# Patient Record
Sex: FEMALE | Race: WHITE | Employment: FULL TIME | URBAN - METROPOLITAN AREA
[De-identification: names, ages, dates, MRNs, and addresses within clinical notes are randomized per-mention and may not be internally consistent; named-entity substitution may affect disease eponyms.]

---

## 2017-06-27 ENCOUNTER — APPOINTMENT (OUTPATIENT)
Dept: MAMMOGRAPHY | Age: 42
End: 2017-06-27

## 2017-06-27 ENCOUNTER — OFFICE VISIT (OUTPATIENT)
Dept: OBGYN CLINIC | Age: 42
End: 2017-06-27

## 2017-06-27 VITALS
DIASTOLIC BLOOD PRESSURE: 78 MMHG | RESPIRATION RATE: 18 BRPM | WEIGHT: 193.4 LBS | BODY MASS INDEX: 32.22 KG/M2 | HEIGHT: 65 IN | SYSTOLIC BLOOD PRESSURE: 120 MMHG

## 2017-06-27 DIAGNOSIS — R92.8 ABNORMAL MAMMOGRAM OF LEFT BREAST: ICD-10-CM

## 2017-06-27 DIAGNOSIS — E28.2 PCOS (POLYCYSTIC OVARIAN SYNDROME): ICD-10-CM

## 2017-06-27 DIAGNOSIS — R45.86 MOOD CHANGES: ICD-10-CM

## 2017-06-27 DIAGNOSIS — Z12.31 ENCOUNTER FOR SCREENING MAMMOGRAM FOR MALIGNANT NEOPLASM OF BREAST: ICD-10-CM

## 2017-06-27 DIAGNOSIS — N64.4 BREAST TENDERNESS: ICD-10-CM

## 2017-06-27 DIAGNOSIS — Z01.419 ENCOUNTER FOR GYNECOLOGICAL EXAMINATION (GENERAL) (ROUTINE) WITHOUT ABNORMAL FINDINGS: Primary | ICD-10-CM

## 2017-06-27 RX ORDER — FLUTICASONE PROPIONATE AND SALMETEROL 50; 250 UG/1; UG/1
POWDER RESPIRATORY (INHALATION)
Refills: 12 | COMMUNITY
Start: 2017-05-03 | End: 2022-07-08

## 2017-06-27 RX ORDER — NORETHINDRONE ACETATE AND ETHINYL ESTRADIOL, ETHINYL ESTRADIOL AND FERROUS FUMARATE 1MG-10(24)
1 KIT ORAL DAILY
Qty: 3 PACKAGE | Refills: 1 | Status: SHIPPED | OUTPATIENT
Start: 2017-06-27 | End: 2022-07-08

## 2017-06-27 RX ORDER — ESCITALOPRAM OXALATE 10 MG/1
TABLET ORAL
Refills: 3 | COMMUNITY
Start: 2017-05-16 | End: 2022-07-08

## 2017-06-27 RX ORDER — HYDROXYZINE HYDROCHLORIDE 10 MG/1
TABLET, FILM COATED ORAL
Refills: 0 | COMMUNITY
Start: 2017-05-30 | End: 2022-07-08

## 2017-06-27 RX ORDER — ALBUTEROL SULFATE 90 UG/1
AEROSOL, METERED RESPIRATORY (INHALATION)
Refills: 3 | COMMUNITY
Start: 2017-05-03 | End: 2022-07-08

## 2017-06-27 NOTE — MR AVS SNAPSHOT
Visit Information Date & Time Provider Department Dept. Phone Encounter #  
 6/27/2017  8:30 AM Miguel Stover MD [facility] [phone] [id number] Follow-up Instructions Return in about 1 year (around 6/27/2018), or if symptoms worsen or fail to improve, for Annual exam. Upcoming Health Maintenance Date Due  
 BREAST CANCER SCRN MAMMOGRAM 6/27/2017 INFLUENZA AGE 9 TO ADULT 8/1/2017 PAP AKA CERVICAL CYTOLOGY 2/18/2019 Allergies as of 6/27/2017  Review Complete On: 6/27/2017 By: Miguel Stover MD  
  
 Severity Noted Reaction Type Reactions Penicillins  01/07/2014    Rash Current Immunizations  Never Reviewed No immunizations on file. Not reviewed this visit You Were Diagnosed With   
  
 Codes Comments Encounter for gynecological examination (general) (routine) without abnormal findings    -  Primary ICD-10-CM: R71.375 ICD-9-CM: V72.31 Vitals BP Resp Height(growth percentile) Weight(growth percentile) LMP BMI  
 120/78 (BP 1 Location: Left arm, BP Patient Position: Sitting) 18 5' 4.5\" (1.638 m) 193 lb 6.4 oz (87.7 kg) 06/17/2017 (Exact Date) 32.68 kg/m2 OB Status Smoking Status Having regular periods Never Smoker Vitals History BMI and BSA Data Body Mass Index Body Surface Area  
 32.68 kg/m 2 2 m 2 Preferred Pharmacy Pharmacy Name Phone [id number] [facility], [address] [place] [address] Road [phone] Your Updated Medication List  
  
   
This list is accurate as of: 6/27/17  9:02 AM.  Always use your most recent med list.  
  
  
  
  
 ADVAIR DISKUS 250-50 mcg/dose diskus inhaler Generic drug:  fluticasone-salmeterol INHALE TWICE A DAY AS DIRECTED BENADRYL PO Take  by mouth. CELEXA PO Take 10 mg by mouth daily. escitalopram oxalate 10 mg tablet Commonly known as:  Tod Crump TAKE 1 TABLET BY MOUTH EVERY DAY  
  
 hydrOXYzine HCl 10 mg tablet Commonly known as:  ATARAX TAKE 1 TABLET BY MOUTH 3 TIMES A DAY AS NEEDED FOR ITCHING  
  
 MELATONIN PO Take  by mouth.  
  
 metFORMIN  mg tablet Commonly known as:  GLUCOPHAGE XR  
  
 pravastatin 20 mg tablet Commonly known as:  PRAVACHOL  
  
 PROAIR HFA 90 mcg/actuation inhaler Generic drug:  albuterol INHALE 2 PUFFS EVERY 4 HOURS AS NEEDED  
  
 tiZANidine 6 mg capsule Commonly known as:  Gabino Moreno Follow-up Instructions Return in about 1 year (around 6/27/2018), or if symptoms worsen or fail to improve, for Annual exam.  
  
  
Patient Instructions Well Visit, Ages 25 to 48: Care Instructions Your Care Instructions Physical exams can help you stay healthy. Your doctor has checked your overall health and may have suggested ways to take good care of yourself. He or she also may have recommended tests. At home, you can help prevent illness with healthy eating, regular exercise, and other steps. Follow-up care is a key part of your treatment and safety. Be sure to make and go to all appointments, and call your doctor if you are having problems. It's also a good idea to know your test results and keep a list of the medicines you take. How can you care for yourself at home? · Reach and stay at a healthy weight. This will lower your risk for many problems, such as obesity, diabetes, heart disease, and high blood pressure. · Get at least 30 minutes of physical activity on most days of the week. Walking is a good choice. You also may want to do other activities, such as running, swimming, cycling, or playing tennis or team sports. Discuss any changes in your exercise program with your doctor. · Do not smoke or allow others to smoke around you. If you need help quitting, talk to your doctor about stop-smoking programs and medicines. These can increase your chances of quitting for good. · Talk to your doctor about whether you have any risk factors for sexually transmitted infections (STIs). Having one sex partner (who does not have STIs and does not have sex with anyone else) is a good way to avoid these infections. · Use birth control if you do not want to have children at this time. Talk with your doctor about the choices available and what might be best for you. · Protect your skin from too much sun. When you're outdoors from 10 a.m. to 4 p.m., stay in the shade or cover up with clothing and a hat with a wide brim. Wear sunglasses that block UV rays. Even when it's cloudy, put broad-spectrum sunscreen (SPF 30 or higher) on any exposed skin. · See a dentist one or two times a year for checkups and to have your teeth cleaned. · Wear a seat belt in the car. · Drink alcohol in moderation, if at all. That means no more than 2 drinks a day for men and 1 drink a day for women. Follow your doctor's advice about when to have certain tests. These tests can spot problems early. For everyone · Cholesterol. Have the fat (cholesterol) in your blood tested after age 21. Your doctor will tell you how often to have this done based on your age, family history, or other things that can increase your risk for heart disease. · Blood pressure. Have your blood pressure checked during a routine doctor visit. Your doctor will tell you how often to check your blood pressure based on your age, your blood pressure results, and other factors. · Vision. Talk with your doctor about how often to have a glaucoma test. 
· Diabetes. Ask your doctor whether you should have tests for diabetes. · Colon cancer. Have a test for colon cancer at age 48. You may have one of several tests. If you are younger than 48, you may need a test earlier if you have any risk factors.  Risk factors include whether you already had a precancerous polyp removed from your colon or whether your parent, brother, sister, or child has had colon cancer. For women · Breast exam and mammogram. Talk to your doctor about when you should have a clinical breast exam and a mammogram. Medical experts differ on whether and how often women under 50 should have these tests. Your doctor can help you decide what is right for you. · Pap test and pelvic exam. Begin Pap tests at age 24. A Pap test is the best way to find cervical cancer. The test often is part of a pelvic exam. Ask how often to have this test. 
· Tests for sexually transmitted infections (STIs). Ask whether you should have tests for STIs. You may be at risk if you have sex with more than one person, especially if your partners do not wear condoms. For men · Tests for sexually transmitted infections (STIs). Ask whether you should have tests for STIs. You may be at risk if you have sex with more than one person, especially if you do not wear a condom. · Testicular cancer exam. Ask your doctor whether you should check your testicles regularly. · Prostate exam. Talk to your doctor about whether you should have a blood test (called a PSA test) for prostate cancer. Experts differ on whether and when men should have this test. Some experts suggest it if you are older than 39 and are -American or have a father or brother who got prostate cancer when he was younger than 72. When should you call for help? Watch closely for changes in your health, and be sure to contact your doctor if you have any problems or symptoms that concern you. Where can you learn more? Go to http://alli-lynn.info/. Enter P072 in the search box to learn more about \"Well Visit, Ages 25 to 48: Care Instructions. \" Current as of: July 19, 2016 Content Version: 11.3 © 9127-6150 [employer].  Care instructions adapted under license by [employer] (which disclaims liability or warranty for this information). If you have questions about a medical condition or this instruction, always ask your healthcare professional. [address] any warranty or liability for your use of this information. Introducing [facility]! Dear Rosangela Medina: 
Thank you for requesting a [employer] account. Our records indicate that you already have an active [employer] account. You can access your account anytime at [email or web address] Did you know that you can access your hospital and ER discharge instructions at any time in [employer]? You can also review all of your test results from your hospital stay or ER visit. Additional Information If you have questions, please visit the Frequently Asked Questions section of the [employer] website at [email or web address] Remember, [employer] is NOT to be used for urgent needs. For medical emergencies, dial 911. Now available from your iPhone and Android! Please provide this summary of care documentation to your next provider. Your primary care clinician is listed as Aminah Tate. If you have any questions after today's visit, please call [phone].

## 2017-06-27 NOTE — PATIENT INSTRUCTIONS

## 2017-06-27 NOTE — PROGRESS NOTES
[facility]  [email or web address]  [phone]    Ja Vargas MD, [address] [facility]       Annual Gynecologic Exam  WWE 40-60  Chief Complaint   Patient presents with    Well Woman       Herbert Amaya is a 36 y.o.  WHITE OR   female who presents for an annual exam.  Patient's last menstrual period was 2017 (exact date). .    She does not report additional concerns today. Has breast pain that fluctuates before menses. +caffeine intake. Menses q month, not as heavy. She also c/o more mood changes/depression the week before menses. No Si/hi. Menstrual status:  Her periods are moderate. She does not report dysmenorrhea/painful menses. She does not report irregular bleeding. Sexual history and Contraception:  History   Sexual Activity    Sexual activity: Yes    Partners: Male    Birth control/ protection: Surgical     Comment: tubiligation     She never uses condoms with sexual activity. She does not reports new sexual partner(s) in the last year. The patient does not request STD testing. Preventive Medicine History:  Her last annual GYN exam was about one year ago. Her most recent Pap smear result: normal was obtained in 2014. Her most recent HR HPV screen was Negative obtained 3 year(s) ago. She does not have a history of KIKO 2, 3 or cervical cancer. Breast health:  Last mammogram: was abnormal: 2016. A mammogram was scheduled for today. Breast cancer family updated: see . Bone health: Peter Cheung She does not have a history of osteopenia/osteoporosis. Osteoporosis family history updated: see .      Past Medical History:   Diagnosis Date    Anxiety     Ectopic pregnancy     History of mammogram 5/26/15    Negative    Hyperlipidemia     Pap smear for cervical cancer screening 14    Negative, HPV negative    PCOS (polycystic ovarian syndrome)      OB History    Para Term  AB Living   3 2 2  1 2   SAB TAB Ectopic Molar Multiple Live Births     1         # Outcome Date GA Lbr Panfilo/2nd Weight Sex Delivery Anes PTL Lv   3 Ectopic            2 Term            1 Term               Obstetric Comments   Menarche: 10   LMP: 15. # of Children:  2. Age at Delivery of First Child:  26.   Hysterectomy/oophorectomy:  NO/NO. Breast Bx:  no.  Hx of Breast Feeding:  yes. BCP:  yes. Hormone therapy:  no.        Past Surgical History:   Procedure Laterality Date    HX  SECTION      HX GYN      ablation of endometriosis (clarify with pt at [facility]) ? op note vs endo ablation    HX OTHER SURGICAL      adominoplasty    LAP,TUBAL CAUTERY       Family History   Problem Relation Age of Onset    Breast Cancer Other     Heart Attack Other     Heart Disease Other     Elevated Lipids Other     Hypertension Other     Other Other      lung cancer    No Known Problems Mother     Breast Cancer Maternal Grandmother     No Known Problems Father      Social History     Social History    Marital status:      Spouse name: N/A    Number of children: N/A    Years of education: N/A     Occupational History    Not on file. Social History Main Topics    Smoking status: Never Smoker    Smokeless tobacco: Never Used    Alcohol use No    Drug use: No    Sexual activity: Yes     Partners: Male     Birth control/ protection: Surgical      Comment: tubiligation     Other Topics Concern    Not on file     Social History Narrative       Allergies   Allergen Reactions    Penicillins Rash       Current Outpatient Prescriptions   Medication Sig    LO LOESTRIN FE 1 mg-10 mcg (24)/10 mcg (2) tab Take 1 Tab by mouth daily. KXL#[id number] PCN#[id number] GRP#[id number] ZD#[phone]    pravastatin (PRAVACHOL) 20 mg tablet     metFORMIN ER (GLUCOPHAGE XR) 500 mg tablet     CITALOPRAM HYDROBROMIDE (CELEXA PO) Take 10 mg by mouth daily.     hydrOXYzine HCl (ATARAX) 10 mg tablet TAKE 1 TABLET BY MOUTH 3 TIMES A DAY AS NEEDED FOR ITCHING    ADVAIR DISKUS 250-50 mcg/dose diskus inhaler INHALE TWICE A DAY AS DIRECTED    escitalopram oxalate (LEXAPRO) 10 mg tablet TAKE 1 TABLET BY MOUTH EVERY DAY    PROAIR HFA 90 mcg/actuation inhaler INHALE 2 PUFFS EVERY 4 HOURS AS NEEDED    tiZANidine (ZANAFLEX) 6 mg capsule     DIPHENHYDRAMINE HCL (BENADRYL PO) Take  by mouth.  MELATONIN PO Take  by mouth. No current facility-administered medications for this visit.         Patient Active Problem List   Diagnosis Code    PCOS (polycystic ovarian syndrome) E28.2       Review of Systems - History obtained from the patient  Constitutional: negative for weight loss, fever, night sweats  HEENT: negative for hearing loss, earache, congestion, snoring, sorethroat  CV: negative for chest pain, palpitations, edema  Resp: negative for cough, shortness of breath, wheezing  GI: negative for change in bowel habits, abdominal pain, black or bloody stools  : negative for frequency, dysuria, hematuria, vaginal discharge  MSK: negative for back pain, joint pain, muscle pain  Breast: negative for breast lumps, nipple discharge, galactorrhea  Skin :negative for itching, rash, hives  Neuro: negative for dizziness, headache, confusion, weakness  Psych: negative for anxiety, depression, change in mood  Heme/lymph: negative for bleeding, bruising, pallor    Physical Exam  Visit Vitals    /78 (BP 1 Location: Left arm, BP Patient Position: Sitting)    Resp 18    Ht 5' 4.5\" (1.638 m)    Wt 193 lb 6.4 oz (87.7 kg)    LMP 06/17/2017 (Exact Date)    BMI 32.68 kg/m2       Constitutional  · Appearance: well-nourished, well developed, alert, in no acute distress    HENT  · Head and Face: appears normal    Neck  · Inspection/Palpation: normal appearance, no masses or tenderness  · Lymph Nodes: no lymphadenopathy present  · Thyroid: gland size normal, nontender, no nodules or masses present on palpation    Chest  · Respiratory Effort: breathing labored  · Auscultation: normal breath sounds    Cardiovascular  · Heart:  · Auscultation: regular rate and rhythm without murmur    Breasts  · Inspection of Breasts: breasts symmetrical, no skin changes, no discharge present, nipple appearance normal, no skin retraction present  · Palpation of Breasts and Axillae: no masses present on palpation, no breast tenderness  · Axillary Lymph Nodes: no lymphadenopathy present    Gastrointestinal  · Abdominal Examination: abdomen non-tender to palpation, normal bowel sounds, no masses present  · Liver and spleen: no hepatomegaly present, spleen not palpable  · Hernias: no hernias identified    Genitourinary  · External Genitalia: normal appearance for age, no discharge present, no tenderness present, no inflammatory lesions present, no masses present, no atrophy present  · Vagina: normal vaginal vault without central or paravaginal defects, no discharge present, no inflammatory lesions present, no masses present  · Bladder: non-tender to palpation  · Urethra: appears normal  · Cervix: normal   · Uterus: normal size, shape and consistency  · Adnexa: no adnexal tenderness present, no adnexal masses present  · Perineum: perineum within normal limits, no evidence of trauma, no rashes or skin lesions present  · Anus: anus within normal limits, no hemorrhoids present  · Inguinal Lymph Nodes: no lymphadenopathy present    Skin  · General Inspection: no rash, no lesions identified    Neurologic/Psychiatric  · Mental Status:  · Orientation: grossly oriented to person, place and time  · Mood and Affect: mood normal, affect appropriate    Assessment:  36 y.o.  for well woman exam  Encounter Diagnoses   Name Primary?     Encounter for gynecological examination (general) (routine) without abnormal findings Yes    Mood changes (HCC)     Breast tenderness     PCOS (polycystic ovarian syndrome)        Plan:  The patient was counseled about diet, exercise, healthy lifestyle  We discussed current self breast exam and mammogram recommendations  We discussed current pap smear and HR HPV testing guidelines  We recommend follow up in one year for routine annual gynecologic exam or sooner if needed  We recommend follow up with a primary care physician for any chronic medical problems or non-gynecologic concerns    We discussed calcium/vitamin D/weight bearing exercise and osteoporosis prevention  Handouts were given to the patient    We discussed typical perimenopausal bleeding patterns. I recommend that she notify MD for chaotic or symptomatic bleeding, or bleeding in between menses or intermenstrual bleeding for additional evaluation. Discussed risks, benefits and alternatives of OCP: including but not limited to dvt/pe/mi/cva/ca/gi risks. Wants to see if helps with mood/regulate cycles/breast pain. OCP fu check mood 3 mos or sooner prn. PCP fu for managing SSRI/SRNI     Folllow up:  [x] return for annual well woman exam in one year or sooner if she is having problems  [] follow up and ultrasound  [x] mammogram  [] 6 months  [] 3 months  [] 1 month    Orders Placed This Encounter    LO LOESTRIN FE 1 mg-10 mcg (24)/10 mcg (2) tab       Results for orders placed or performed in visit on 06/27/17   [facility] MAMMO BI SCREENING INCL CAD    Narrative    STUDY:  Bilateral digital screening mammogram      INDICATION:  Screening    COMPARISON:  Most recent 2016      FINDINGS: Bilateral digital screening mammography was performed, and is  interpreted in conjunction with a computer assisted detection (CAD) system. The  breast parenchymal pattern is scattered fibroglandular densities. Possible  increasing density noted in the left breast.  Spot compression views and  possible ultrasound are recommended for further evaluation.   No new masses or  suspicious calcifications are identified in the right breast.       Impression    IMPRESSION:   Possible increasing density, left breast.  Additional evaluation is indicated. The patient will be contacted for this, and a supplemental report will follow. No mammographic evidence of malignancy, right breast.    BIRADS 0:  Needs additional imaging evaluation.

## 2017-06-28 ENCOUNTER — HOSPITAL ENCOUNTER (OUTPATIENT)
Dept: ULTRASOUND IMAGING | Age: 42
Discharge: HOME | End: 2017-06-28
Attending: OBSTETRICS & GYNECOLOGY
Payer: COMMERCIAL

## 2017-06-28 ENCOUNTER — HOSPITAL ENCOUNTER (OUTPATIENT)
Dept: MAMMOGRAPHY | Age: 42
Discharge: HOME | End: 2017-06-28
Attending: OBSTETRICS & GYNECOLOGY
Payer: COMMERCIAL

## 2017-06-28 DIAGNOSIS — R92.8 ABNORMAL MAMMOGRAM OF LEFT BREAST: ICD-10-CM

## 2017-06-28 PROCEDURE — 77065 DX MAMMO INCL CAD UNI: CPT

## 2017-06-28 PROCEDURE — 76642 ULTRASOUND BREAST LIMITED: CPT

## 2017-06-30 NOTE — PROGRESS NOTES
The results are BIRADS 3  Update chart and tickle for 6mo fu/see other imaging. Please update chart/history, if needed.

## 2017-07-06 ENCOUNTER — TELEPHONE (OUTPATIENT)
Dept: OBGYN CLINIC | Age: 42
End: 2017-07-06

## 2017-07-06 DIAGNOSIS — R92.8 FOLLOW-UP EXAMINATION OF ABNORMAL MAMMOGRAM: Primary | ICD-10-CM

## 2017-07-06 NOTE — TELEPHONE ENCOUNTER
Patient calling stating that she has a 6 month follow up appointment scheduled for a diagnostic mammogram scheduled for December 4, 2017 and needed an order to go with that appointment. Patient aware that an appointment has been made for 12/04/2017 and the order is in the system. Patient verbalized understanding.

## 2019-08-09 NOTE — PATIENT INSTRUCTIONS
Well Visit, Ages 25 to 48: Care Instructions  Your Care Instructions    Physical exams can help you stay healthy. Your doctor has checked your overall health and may have suggested ways to take good care of yourself. He or she also may have recommended tests. At home, you can help prevent illness with healthy eating, regular exercise, and other steps. Follow-up care is a key part of your treatment and safety. Be sure to make and go to all appointments, and call your doctor if you are having problems. It's also a good idea to know your test results and keep a list of the medicines you take. How can you care for yourself at home? · Reach and stay at a healthy weight. This will lower your risk for many problems, such as obesity, diabetes, heart disease, and high blood pressure. · Get at least 30 minutes of physical activity on most days of the week. Walking is a good choice. You also may want to do other activities, such as running, swimming, cycling, or playing tennis or team sports. Discuss any changes in your exercise program with your doctor. · Do not smoke or allow others to smoke around you. If you need help quitting, talk to your doctor about stop-smoking programs and medicines. These can increase your chances of quitting for good. · Talk to your doctor about whether you have any risk factors for sexually transmitted infections (STIs). Having one sex partner (who does not have STIs and does not have sex with anyone else) is a good way to avoid these infections. · Use birth control if you do not want to have children at this time. Talk with your doctor about the choices available and what might be best for you. · Protect your skin from too much sun. When you're outdoors from 10 a.m. to 4 p.m., stay in the shade or cover up with clothing and a hat with a wide brim. Wear sunglasses that block UV rays. Even when it's cloudy, put broad-spectrum sunscreen (SPF 30 or higher) on any exposed skin.   · See a dentist one or two times a year for checkups and to have your teeth cleaned. · Wear a seat belt in the car. Follow your doctor's advice about when to have certain tests. These tests can spot problems early. For everyone  · Cholesterol. Have the fat (cholesterol) in your blood tested after age 21. Your doctor will tell you how often to have this done based on your age, family history, or other things that can increase your risk for heart disease. · Blood pressure. Have your blood pressure checked during a routine doctor visit. Your doctor will tell you how often to check your blood pressure based on your age, your blood pressure results, and other factors. · Vision. Talk with your doctor about how often to have a glaucoma test.  · Diabetes. Ask your doctor whether you should have tests for diabetes. · Colon cancer. Your risk for colorectal cancer gets higher as you get older. Some experts say that adults should start regular screening at age 48 and stop at age 76. Others say to start before age 48 or continue after age 76. Talk with your doctor about your risk and when to start and stop screening. For women  · Breast exam and mammogram. Talk to your doctor about when you should have a clinical breast exam and a mammogram. Medical experts differ on whether and how often women under 50 should have these tests. Your doctor can help you decide what is right for you. · Cervical cancer screening test and pelvic exam. Begin with a Pap test at age 24. The test often is part of a pelvic exam. Starting at age 27, you may choose to have a Pap test, an HPV test, or both tests at the same time (called co-testing). Talk with your doctor about how often to have testing. · Tests for sexually transmitted infections (STIs). Ask whether you should have tests for STIs. You may be at risk if you have sex with more than one person, especially if your partners do not wear condoms.   For men  · Tests for sexually transmitted infections (STIs). Ask whether you should have tests for STIs. You may be at risk if you have sex with more than one person, especially if you do not wear a condom. · Testicular cancer exam. Ask your doctor whether you should check your testicles regularly. · Prostate exam. Talk to your doctor about whether you should have a blood test (called a PSA test) for prostate cancer. Experts differ on whether and when men should have this test. Some experts suggest it if you are older than 39 and are -American or have a father or brother who got prostate cancer when he was younger than 72. When should you call for help? Watch closely for changes in your health, and be sure to contact your doctor if you have any problems or symptoms that concern you. Where can you learn more? Go to http://alli-lynn.info/. Enter P072 in the search box to learn more about \"Well Visit, Ages 25 to 48: Care Instructions. \"  Current as of: December 13, 2018  Content Version: 12.1  © 9049-1498 [employer]. Care instructions adapted under license by [employer] (which disclaims liability or warranty for this information). If you have questions about a medical condition or this instruction, always ask your healthcare professional. [place] [address] any warranty or liability for your use of this information.

## 2019-08-09 NOTE — PROGRESS NOTES
[facility]  [email or web address]  [phone]    Miquel Weiss MD, [address] [facility]       Annual Gynecologic Exam  [facility]-60  Chief Complaint   Patient presents with    Well Woman    Other     homronal problems, hair loss, trouble losing weight, cramping with cycle. Dorothy Argueta Son is a 38 y.o.  WHITE OR   female who presents for an annual exam.  Patient's last menstrual period was 2019. Matthew Taveras She does report additional concerns today. Would like to discuss menopause. Menstrual status:  Her periods are moderate, regular cycles. She does report dysmenorrhea/painful menses. She does not report irregular bleeding. Sexual history and Contraception:  Social History     Substance and Sexual Activity   Sexual Activity Yes    Partners: Male    Birth control/protection: Surgical    Comment: tubiligation       She does not reports new sexual partner(s) in the last year. The patient does not request STD testing. MMG scheduled for     Preventive Medicine History:  Her most recent Pap smear result: normal was obtained in 2016    Her most recent HR HPV screen was Negative obtained in     She does not have a history of KIKO 2, 3 or cervical cancer. Breast health:  Last mammogram: approximate date 16 and was abnormal, ultrasound done also abnormal.   A mammogram was not scheduled for today. Breast cancer family updated: see FH. Bone health: Matthew Taveras She does not have a history of osteopenia/osteoporosis. Osteoporosis family history updated: see FH.      Past Medical History:   Diagnosis Date    Abnormal mammogram of left breast 2017    Possible increasing density, left breast- add'l views completed 17 BIRADS 3 -f/u 6 mo    Anxiety     Ectopic pregnancy     History of mammogram 5/26/15    Negative    Hyperlipidemia     Pap smear for cervical cancer screening 14    Negative, HPV negative    PCOS (polycystic ovarian syndrome)      OB History    Para Term  AB Living   3 2 2   1 2   SAB TAB Ectopic Molar Multiple Live Births       1            # Outcome Date GA Lbr Panfilo/2nd Weight Sex Delivery Anes PTL Lv   3 Ectopic            2 Term            1 Term               Obstetric Comments   Menarche: 10   LMP: 15. # of Children:  2. Age at Delivery of First Child:  26.   Hysterectomy/oophorectomy:  NO/NO. Breast Bx:  no.  Hx of Breast Feeding:  yes. BCP:  yes. Hormone therapy:  no.        Past Surgical History:   Procedure Laterality Date    HX ABDOMINOPLASTY      HX BREAST BIOPSY Left     nnegative pathology    HX  SECTION      HX GYN      ablation of endometriosis (clarify with pt at nv) ? op note vs endo ablation    LAP,TUBAL CAUTERY       Family History   Problem Relation Age of Onset    Breast Cancer Other     Heart Attack Other     Heart Disease Other     Elevated Lipids Other     Hypertension Other     Other Other         lung cancer    No Known Problems Mother     Breast Cancer Maternal Grandmother     No Known Problems Father      Social History     Socioeconomic History    Marital status:      Spouse name: Not on file    Number of children: Not on file    Years of education: Not on file    Highest education level: Not on file   Occupational History    Not on file   Social Needs    Financial resource strain: Not on file    Food insecurity:     Worry: Not on file     Inability: Not on file    Transportation needs:     Medical: Not on file     Non-medical: Not on file   Tobacco Use    Smoking status: Never Smoker    Smokeless tobacco: Never Used   Substance and Sexual Activity    Alcohol use: No    Drug use: No    Sexual activity: Yes     Partners: Male     Birth control/protection: Surgical     Comment: tubiligation   Lifestyle    Physical activity:     Days per week: Not on file     Minutes per session: Not on file    Stress: Not on file   Relationships  Social connections:     Talks on phone: Not on file     Gets together: Not on file     Attends Anabaptist service: Not on file     Active member of club or organization: Not on file     Attends meetings of clubs or organizations: Not on file     Relationship status: Not on file    Intimate partner violence:     Fear of current or ex partner: Not on file     Emotionally abused: Not on file     Physically abused: Not on file     Forced sexual activity: Not on file   Other Topics Concern    Not on file   Social History Narrative    Not on file       Allergies   Allergen Reactions    Penicillins Rash       Current Outpatient Medications   Medication Sig    sertraline (ZOLOFT) 50 mg tablet TAKE 1 TABLET BY MOUTH EVERY DAY    ADVAIR DISKUS 250-50 mcg/dose diskus inhaler INHALE TWICE A DAY AS DIRECTED    escitalopram oxalate (LEXAPRO) 10 mg tablet TAKE 1 TABLET BY MOUTH EVERY DAY    PROAIR HFA 90 mcg/actuation inhaler INHALE 2 PUFFS EVERY 4 HOURS AS NEEDED    pravastatin (PRAVACHOL) 20 mg tablet     metFORMIN ER (GLUCOPHAGE XR) 500 mg tablet     DIPHENHYDRAMINE HCL (BENADRYL PO) Take  by mouth.  MELATONIN PO Take  by mouth.  hydrOXYzine HCl (ATARAX) 10 mg tablet TAKE 1 TABLET BY MOUTH 3 TIMES A DAY AS NEEDED FOR ITCHING    LO LOESTRIN FE 1 mg-10 mcg (24)/10 mcg (2) tab Take 1 Tab by mouth daily. [facility]#[id number] PCN#[facility]#[id number] #[id number]    tiZANidine (ZANAFLEX) 6 mg capsule     CITALOPRAM HYDROBROMIDE (CELEXA PO) Take 10 mg by mouth daily. No current facility-administered medications for this visit.         Patient Active Problem List   Diagnosis Code    PCOS (polycystic ovarian syndrome) E28.2       Review of Systems - History obtained from the patient  Constitutional: positive for weak, tired all of the time  HEENT: positive for visual changes, headaches  CV: positive for occasional  LE edema  Resp: negative for cough, shortness of breath, wheezing  GI: positive for occasional diarrhea and constipation  :positive for leakage of urine when coughing  MSK: positive for joint pain (arthritis), back pain  Breast: negative for breast lumps, nipple discharge, galactorrhea  Skin :negative for itching, rash, hives, +hair thinning, hair loss (saw derm in past: had bx)  Neuro: negative for dizziness, headache, confusion, weakness  Psych: negative for anxiety, depression, change in mood  Heme/lymph: negative for bleeding, bruising, pallor      (WWE continued)     Physical Exam  Visit Vitals  /78 (BP 1 Location: Left arm, BP Patient Position: Sitting)   Ht 5' 4.5\" (1.638 m)   Wt 196 lb 3.2 oz (89 kg)   LMP 08/01/2019   BMI 33.16 kg/m²       Constitutional  · Appearance: well-nourished, well developed, alert, in no acute distress    HENT  · Head and Face: appears normal    Neck  · Inspection/Palpation: normal appearance, no masses or tenderness  · Lymph Nodes: no lymphadenopathy present  · Thyroid: gland size normal, nontender, no nodules or masses present on palpation    Chest  · Respiratory Effort: breathing unlabored  · Auscultation: normal breath sounds    Cardiovascular  · Heart:  · Auscultation: regular rate and rhythm without murmur    Breasts  · Inspection of Breasts: breasts symmetrical, no skin changes, no discharge present, nipple appearance normal, no skin retraction present  · Palpation of Breasts and Axillae: no masses present on palpation, no breast tenderness  · Axillary Lymph Nodes: no lymphadenopathy present    Gastrointestinal  · Abdominal Examination: abdomen non-tender to palpation, normal bowel sounds, no masses present  · Liver and spleen: no hepatomegaly present, spleen not palpable  · Hernias: no hernias identified    Genitourinary  · External Genitalia: normal appearance for age, no discharge present, no tenderness present, no inflammatory lesions present, no masses present, without atrophy present  · Vagina: normal vaginal vault without central or paravaginal defects, no discharge present, no inflammatory lesions present, no masses present  · Bladder: non-tender to palpation  · Urethra: appears normal  · Cervix: normal   · Uterus: normal size, shape and consistency  · Adnexa: no adnexal tenderness present, no adnexal masses present  · Perineum: perineum within normal limits, no evidence of trauma, no rashes or skin lesions present  · Anus: anus within normal limits, no hemorrhoids present  · Inguinal Lymph Nodes: no lymphadenopathy present    Skin  · General Inspection: no rash, no lesions identified    Neurologic/Psychiatric  · Mental Status:  · Orientation: grossly oriented to person, place and time  · Mood and Affect: mood normal, affect appropriate    Assessment:  38 y.o.  for well woman exam  Encounter Diagnoses   Name Primary?  PCOS (polycystic ovarian syndrome)     Encounter for gynecological examination (general) (routine) without abnormal findings Yes    Screening for cervical cancer     Hair loss     Memory problem     Dysmenorrhea     Tobacco use        Plan:  The patient was counseled about diet, exercise, healthy lifestyle  We discussed current self breast exam and mammogram recommendations  We discussed current pap smear and HR HPV testing guidelines  We recommend follow up in one year for routine annual gynecologic exam or sooner if needed  We recommend follow up with a primary care physician for any chronic medical problems or non-gynecologic concerns    We discussed calcium/vitamin D/weight bearing exercise and osteoporosis prevention  Handouts were given to the patient  Disc PCOS management  rec avoid estrogen if +tobacco use  Rec tobacco cessation. PCOS labs   We discussed progesterone only and non hormonal options for contraception including but not limited to condoms, IUDs, Nexplanon, and depo provera. Pt considering nexplanon, h/o given  It is preferred to place at the beginning of cycle.   Confirm AE up to date, pap up to date, no unprotected intercourse x 2 weeks prior to visit, and that she has checked with her insurance about coverage. Consider derm fu again prn for hair changes  We discussed typical perimenopausal bleeding patterns and symptoms. I recommend that she notify MD for chaotic or symptomatic bleeding, or bleeding in between menses or intermenstrual bleeding for additional evaluation. She can also schedule a consult to discuss management of symptoms of perimenopause that are concerning her or interfering with her life or day to day function or activity. Sign release get outside records/pap/mmg/pcos labs       Folllow up:  [x] return for annual well woman exam in one year or sooner if she is having problems  [] follow up and ultrasound  [x] mammogram  [] 6 months  [] 3 months  [] 1 month    Orders Placed This Encounter    [address] AND LH    ESTRADIOL    TESTOSTERONE, FREE & TOTAL    TSH AND FREE T4    PROLACTIN    DHEA    17-OH PROGESTERONE LCMS    HEMOGLOBIN A1C WITH EAG    PAP IG, HPV AND [address] HPV 80/63,94([id number])       No results found for any visits on 08/12/19.

## 2019-08-12 ENCOUNTER — OFFICE VISIT (OUTPATIENT)
Dept: OBGYN CLINIC | Age: 44
End: 2019-08-12

## 2019-08-12 VITALS
DIASTOLIC BLOOD PRESSURE: 78 MMHG | BODY MASS INDEX: 32.69 KG/M2 | HEIGHT: 65 IN | WEIGHT: 196.2 LBS | SYSTOLIC BLOOD PRESSURE: 122 MMHG

## 2019-08-12 DIAGNOSIS — Z72.0 TOBACCO USE: ICD-10-CM

## 2019-08-12 DIAGNOSIS — Z12.4 SCREENING FOR CERVICAL CANCER: ICD-10-CM

## 2019-08-12 DIAGNOSIS — R41.3 MEMORY PROBLEM: ICD-10-CM

## 2019-08-12 DIAGNOSIS — N94.6 DYSMENORRHEA: ICD-10-CM

## 2019-08-12 DIAGNOSIS — Z01.419 ENCOUNTER FOR GYNECOLOGICAL EXAMINATION (GENERAL) (ROUTINE) WITHOUT ABNORMAL FINDINGS: Primary | ICD-10-CM

## 2019-08-12 DIAGNOSIS — E28.2 PCOS (POLYCYSTIC OVARIAN SYNDROME): ICD-10-CM

## 2019-08-12 DIAGNOSIS — L65.9 HAIR LOSS: ICD-10-CM

## 2019-08-12 RX ORDER — SERTRALINE HYDROCHLORIDE 50 MG/1
TABLET, FILM COATED ORAL
Refills: 3 | COMMUNITY
Start: 2019-07-17 | End: 2022-07-08

## 2019-08-14 LAB
17OHP SERPL-MCNC: 149 NG/DL
DHEA SERPL-MCNC: 121 NG/DL (ref 31–701)
EST. AVERAGE GLUCOSE BLD GHB EST-MCNC: 111 MG/DL
ESTRADIOL SERPL-MCNC: 98.7 PG/ML
FSH SERPL-ACNC: 6.3 MIU/ML
HBA1C MFR BLD: 5.5 % (ref 4.8–5.6)
LH SERPL-ACNC: 5.4 MIU/ML
PROLACTIN SERPL-MCNC: 5.4 NG/ML (ref 4.8–23.3)
T4 FREE SERPL-MCNC: 0.92 NG/DL (ref 0.82–1.77)
TESTOST FREE SERPL-MCNC: 1.9 PG/ML (ref 0–4.2)
TESTOST SERPL-MCNC: 12 NG/DL (ref 8–48)
TSH SERPL DL<=0.005 MIU/L-ACNC: 1.78 UIU/ML (ref 0.45–4.5)

## 2019-08-15 LAB
CYTOLOGIST CVX/VAG CYTO: NORMAL
CYTOLOGY CVX/VAG DOC CYTO: NORMAL
CYTOLOGY CVX/VAG DOC THIN PREP: NORMAL
CYTOLOGY HISTORY:: NORMAL
DX ICD CODE: NORMAL
HPV I/H RISK 1 DNA CVX QL PROBE+SIG AMP: NEGATIVE
Lab: NORMAL
OTHER STN SPEC: NORMAL
STAT OF ADQ CVX/VAG CYTO-IMP: NORMAL

## 2019-08-16 ENCOUNTER — TELEPHONE (OUTPATIENT)
Dept: OBGYN CLINIC | Age: 44
End: 2019-08-16

## 2019-08-16 NOTE — TELEPHONE ENCOUNTER
Patient aware of lab results. Patient states that she is feeling off & concerned. She appreciated the call from me, but prefers to speak with Dr. Dyan Rahman and doesn't mind waiting until Monday.

## 2019-08-16 NOTE — PROGRESS NOTES
The results are normal.   Please notify patient. Recommend f/u if still having symptoms/problems or has additional concerns.   ?plan nexplanon

## 2019-08-16 NOTE — TELEPHONE ENCOUNTER
Labs normal  Pap normal  PCOS labs normal  Not menopausal    Ok to schedule nexplanon. Please call pt to see what concerns she has and I will try to get back with her as soon as possible, but it will likely be next week. Also she can send me a Ca[facility] W Gerardo Jennings message with any questions/concerns, if that is easier.

## 2019-08-16 NOTE — TELEPHONE ENCOUNTER
Patient calling for her lab results. She states she had discussed about being rafita-menopausal and suggestions of nexplanon insertion but were contingent upon the results of her blood work. She states she is an occasional smoker and does not want to do Depo. Patient would like to speak with Dr. Eric Burch herself regarding these lab results.     Patient can be reached at [phone]

## 2019-09-19 ENCOUNTER — APPOINTMENT (OUTPATIENT)
Dept: OBGYN CLINIC | Age: 44
End: 2019-09-19

## 2020-12-14 RX ORDER — NORETHINDRONE ACETATE AND ETHINYL ESTRADIOL, ETHINYL ESTRADIOL AND FERROUS FUMARATE 1MG-10(24)
1 KIT ORAL DAILY
Qty: 3 PACKAGE | Refills: 4 | Status: CANCELLED | OUTPATIENT
Start: 2020-12-14

## 2020-12-14 NOTE — PROGRESS NOTES
[address] [facility]  [email or web address]  [phone]    Iraj Pedraza MD, [address] [facility]       Annual Gynecologic Exam  WWE 40-60  Chief Complaint   Patient presents with    Annual Exam       Liliana Thao is a 54 y.o.  WHITE OR   female who presents for an annual exam.  No LMP recorded (lmp unknown). .    She does not report additional concerns today. No menses since increased lexapro dose  Increased stress. Mom: mid 45s menopause. Occ feels warm but not significant hot flashes. Some hair thinning. Has had thyroid checked in past: always wnl. Menstrual status:  Her periods are irregular cycles. She does not report dysmenorrhea/painful menses. She does not report irregular bleeding. Sexual history and Contraception:  Social History     Substance and Sexual Activity   Sexual Activity Yes    Partners: Male    Birth control/protection: Surgical    Comment: tubiligation       She does not reports new sexual partner(s) in the last year. The patient does not request STD testing. Preventive Medicine History:  Her most recent Pap smear result: normal was obtained in 2019    Her most recent HR HPV screen was Negative obtained in     She does not have a history of KIKO 2, 3 or cervical cancer. Breast health:  Last mammogram: was normal.   A mammogram was not scheduled for today. Breast cancer family updated: see FH. Bone health: Branden Walt She does not have a history of osteopenia/osteoporosis. Osteoporosis family history updated: see FH.      Past Medical History:   Diagnosis Date    Abnormal mammogram of left breast 2017    Possible increasing density, left breast- add'l views completed 17 BIRADS 3 -f/u 6 mo    Anxiety     Ectopic pregnancy     H/O mammogram 2018; 07/10/2018    BIRADS 3/ Probably benign; BIRADS 1/Several simple cyst noted, Benign    History of mammogram 5/26/15    Negative    Hx of mammogram 2019    negative  Hyperlipidemia     Pap smear for cervical cancer screening 14; 19    Negative, HPV negative; Neg/Neg HPV    PCOS (polycystic ovarian syndrome)      OB History    Para Term  AB Living   3 2 2   1 2   SAB TAB Ectopic Molar Multiple Live Births       1            # Outcome Date GA Lbr Panfilo/2nd Weight Sex Delivery Anes PTL Lv   3 Ectopic            2 Term            1 Term               Obstetric Comments   Menarche: 10   LMP: 15. # of Children:  2. Age at Delivery of First Child:  26.   Hysterectomy/oophorectomy:  NO/NO. Breast Bx:  no.  Hx of Breast Feeding:  yes. BCP:  yes. Hormone therapy:  no.        Past Surgical History:   Procedure Laterality Date    HX ABDOMINOPLASTY      HX BREAST BIOPSY Left     nnegative pathology    HX  SECTION      HX GYN      ablation of endometriosis (clarify with pt at nv) ? op note vs endo ablation    LAP,TUBAL CAUTERY       Family History   Problem Relation Age of Onset    Breast Cancer Other     Heart Attack Other     Heart Disease Other     Elevated Lipids Other     Hypertension Other     Other Other         lung cancer    No Known Problems Mother     Breast Cancer Maternal Grandmother     No Known Problems Father      Social History     Socioeconomic History    Marital status:      Spouse name: Not on file    Number of children: Not on file    Years of education: Not on file    Highest education level: Not on file   Occupational History    Not on file   Social Needs    Financial resource strain: Not on file    Food insecurity     Worry: Not on file     Inability: Not on file    Transportation needs     Medical: Not on file     Non-medical: Not on file   Tobacco Use    Smoking status: Never Smoker    Smokeless tobacco: Never Used   Substance and Sexual Activity    Alcohol use: No    Drug use: No    Sexual activity: Yes     Partners: Male     Birth control/protection: Surgical     Comment: tubiligation   Lifestyle    Physical activity     Days per week: Not on file     Minutes per session: Not on file    Stress: Not on file   Relationships    Social connections     Talks on phone: Not on file     Gets together: Not on file     Attends Holiness service: Not on file     Active member of club or organization: Not on file     Attends meetings of clubs or organizations: Not on file     Relationship status: Not on file    Intimate partner violence     Fear of current or ex partner: Not on file     Emotionally abused: Not on file     Physically abused: Not on file     Forced sexual activity: Not on file   Other Topics Concern    Not on file   Social History Narrative    Not on file       Allergies   Allergen Reactions    Penicillins Rash       Current Outpatient Medications   Medication Sig    spironolactone (ALDACTONE) 25 mg tablet Take  by mouth daily.  escitalopram oxalate (LEXAPRO) 10 mg tablet TAKE 1 TABLET BY MOUTH EVERY DAY    pravastatin (PRAVACHOL) 20 mg tablet     metFORMIN ER (GLUCOPHAGE XR) 500 mg tablet     sertraline (ZOLOFT) 50 mg tablet TAKE 1 TABLET BY MOUTH EVERY DAY    hydrOXYzine HCl (ATARAX) 10 mg tablet TAKE 1 TABLET BY MOUTH 3 TIMES A DAY AS NEEDED FOR ITCHING    ADVAIR DISKUS 250-50 mcg/dose diskus inhaler INHALE TWICE A DAY AS DIRECTED    PROAIR HFA 90 mcg/actuation inhaler INHALE 2 PUFFS EVERY 4 HOURS AS NEEDED    LO LOESTRIN FE 1 mg-10 mcg (24)/10 mcg (2) tab Take 1 Tab by mouth daily. QFV#[id number] PCN#[id number] QYG#[id number] #[id number]    tiZANidine (ZANAFLEX) 6 mg capsule     DIPHENHYDRAMINE HCL (BENADRYL PO) Take  by mouth.  CITALOPRAM HYDROBROMIDE (CELEXA PO) Take 10 mg by mouth daily.  MELATONIN PO Take  by mouth. No current facility-administered medications for this visit.         Patient Active Problem List   Diagnosis Code    PCOS (polycystic ovarian syndrome) E28.2       Review of Systems - History obtained from the patient  Constitutional/general, HEENT, CV, Resp, GI, MSK, Neuro, Psych, Heme/lymph, Skin, Breast ROS: no significant complaints except as noted on HPI       Physical Exam  Visit Vitals  /81 (BP 1 Location: Left arm, BP Patient Position: Sitting)   LMP  (LMP Unknown)       Constitutional  · Appearance: well-nourished, well developed, alert, in no acute distress    HENT  · Head and Face: appears normal    Neck  · Inspection/Palpation: normal appearance, no masses or tenderness  · Lymph Nodes: no lymphadenopathy present  · Thyroid: gland size normal, nontender, no nodules or masses present on palpation    Chest  · Respiratory Effort: breathing unlabored  · Auscultation: normal breath sounds    Cardiovascular  · Heart:  · Auscultation: regular rate and rhythm without murmur    Breasts  · Inspection of Breasts: breasts symmetrical, no skin changes, no discharge present, nipple appearance normal, no skin retraction present  · Palpation of Breasts and Axillae: no masses present on palpation, no breast tenderness  · Axillary Lymph Nodes: no lymphadenopathy present    Gastrointestinal  · Abdominal Examination: abdomen non-tender to palpation, normal bowel sounds, no masses present  · Liver and spleen: no hepatomegaly present, spleen not palpable  · Hernias: no hernias identified    Genitourinary  · External Genitalia: normal appearance for age, no discharge present, no tenderness present, no inflammatory lesions present, no masses present, without atrophy present  · Vagina: normal vaginal vault without central or paravaginal defects, no discharge present, no inflammatory lesions present, no masses present  · Bladder: non-tender to palpation  · Urethra: appears normal  · Cervix: normal   · Uterus: normal size, shape and consistency  · Adnexa: no adnexal tenderness present, no adnexal masses present  · Perineum: perineum within normal limits, no evidence of trauma, no rashes or skin lesions present  · Anus: anus within normal limits, no hemorrhoids present  · Inguinal Lymph Nodes: no lymphadenopathy present    Skin  · General Inspection: no rash, no lesions identified    Neurologic/Psychiatric  · Mental Status:  · Orientation: grossly oriented to person, place and time  · Mood and Affect: mood normal, affect appropriate    Assessment:  54 y.o.  for well woman exam  Encounter Diagnosis   Name Primary?  Encounter for gynecological examination (general) (routine) without abnormal findings Yes       Plan:  The patient was counseled about healthy lifestyle, disease prevention, and bone protection. We discussed current self breast exam and mammogram recommendations  We discussed current pap smear and HR HPV testing guidelines  We recommend follow up in one year for routine annual gynecologic exam or sooner if needed  Handouts were given to the patient  We recommend follow up with a primary care physician for chronic medical problems and evaluation of non-gynecologic concerns and to please contact our office with any GYN questions or concerns. We discussed typical perimenopausal bleeding patterns and symptoms. I recommend that she notify MD for chaotic or symptomatic bleeding, or bleeding in between menses or intermenstrual bleeding for additional evaluation. She can also schedule a consult to discuss management of symptoms of perimenopause that are concerning her or interfering with her life or day to day function or activity. Plan observation      Folllow up:  [x] return for annual well woman exam in one year or sooner if she is having problems  [] follow up and ultrasound  [x] mammogram  [] 6 months  [] 3 months  [] 1 month    No orders of the defined types were placed in this encounter.       Results for orders placed or performed during the hospital encounter of 12/15/20   [address] MAMMO BI SCREENING INCL CAD    Narrative    STUDY: Bilateral digital screening mammogram with 3-D tomosynthesis    INDICATION: Screening. COMPARISON: [id number]    BREAST COMPOSITION: There are scattered areas of fibroglandular density. FINDINGS: Bilateral digital screening mammography was performed and is  interpreted in conjunction with a computer assisted detection (CAD) system. Additionally, tomosynthesis of both breasts in the CC and MLO projections was  performed. No suspicious masses or calcifications are identified. There has been  no significant change. Impression    IMPRESSION:  BI-RADS 1: Negative. No mammographic evidence of malignancy. RECOMMENDATIONS:  Next screening mammogram is recommended in one year. The patient will be notified of these results.

## 2020-12-14 NOTE — PATIENT INSTRUCTIONS
Well Visit, Ages 25 to 48: Care Instructions  Your Care Instructions     Physical exams can help you stay healthy. Your doctor has checked your overall health and may have suggested ways to take good care of yourself. He or she also may have recommended tests. At home, you can help prevent illness with healthy eating, regular exercise, and other steps. Follow-up care is a key part of your treatment and safety. Be sure to make and go to all appointments, and call your doctor if you are having problems. It's also a good idea to know your test results and keep a list of the medicines you take. How can you care for yourself at home? · Reach and stay at a healthy weight. This will lower your risk for many problems, such as obesity, diabetes, heart disease, and high blood pressure. · Get at least 30 minutes of physical activity on most days of the week. Walking is a good choice. You also may want to do other activities, such as running, swimming, cycling, or playing tennis or team sports. Discuss any changes in your exercise program with your doctor. · Do not smoke or allow others to smoke around you. If you need help quitting, talk to your doctor about stop-smoking programs and medicines. These can increase your chances of quitting for good. · Talk to your doctor about whether you have any risk factors for sexually transmitted infections (STIs). Having one sex partner (who does not have STIs and does not have sex with anyone else) is a good way to avoid these infections. · Use birth control if you do not want to have children at this time. Talk with your doctor about the choices available and what might be best for you. · Protect your skin from too much sun. When you're outdoors from 10 a.m. to 4 p.m., stay in the shade or cover up with clothing and a hat with a wide brim. Wear sunglasses that block UV rays. Even when it's cloudy, put broad-spectrum sunscreen (SPF 30 or higher) on any exposed skin.   · See a dentist one or two times a year for checkups and to have your teeth cleaned. · Wear a seat belt in the car. Follow your doctor's advice about when to have certain tests. These tests can spot problems early. For everyone  · Cholesterol. Have the fat (cholesterol) in your blood tested after age 21. Your doctor will tell you how often to have this done based on your age, family history, or other things that can increase your risk for heart disease. · Blood pressure. Have your blood pressure checked during a routine doctor visit. Your doctor will tell you how often to check your blood pressure based on your age, your blood pressure results, and other factors. · Vision. Talk with your doctor about how often to have a glaucoma test.  · Diabetes. Ask your doctor whether you should have tests for diabetes. · Colon cancer. Your risk for colorectal cancer gets higher as you get older. Some experts say that adults should start regular screening at age 48 and stop at age 76. Others say to start before age 48 or continue after age 76. Talk with your doctor about your risk and when to start and stop screening. For women  · Breast exam and mammogram. Talk to your doctor about when you should have a clinical breast exam and a mammogram. Medical experts differ on whether and how often women under 50 should have these tests. Your doctor can help you decide what is right for you. · Cervical cancer screening test and pelvic exam. Begin with a Pap test at age 24. The test often is part of a pelvic exam. Starting at age 27, you may choose to have a Pap test, an HPV test, or both tests at the same time (called co-testing). Talk with your doctor about how often to have testing. · Tests for sexually transmitted infections (STIs). Ask whether you should have tests for STIs. You may be at risk if you have sex with more than one person, especially if your partners do not wear condoms.   For men  · Tests for sexually transmitted infections (STIs). Ask whether you should have tests for STIs. You may be at risk if you have sex with more than one person, especially if you do not wear a condom. · Testicular cancer exam. Ask your doctor whether you should check your testicles regularly. · Prostate exam. Talk to your doctor about whether you should have a blood test (called a PSA test) for prostate cancer. Experts differ on whether and when men should have this test. Some experts suggest it if you are older than 39 and are -American or have a father or brother who got prostate cancer when he was younger than 72. When should you call for help? Watch closely for changes in your health, and be sure to contact your doctor if you have any problems or symptoms that concern you. Where can you learn more? Go to http://www.olmos.com/  Enter P072 in the search box to learn more about \"Well Visit, Ages 25 to 48: Care Instructions. \"  Current as of: May 27, 2020               Content Version: 12.6  © 2006-2020 [employer]. Care instructions adapted under license by [employer] (which disclaims liability or warranty for this information). If you have questions about a medical condition or this instruction, always ask your healthcare professional. [place] [address] any warranty or liability for your use of this information.

## 2020-12-15 ENCOUNTER — OFFICE VISIT (OUTPATIENT)
Dept: OBGYN CLINIC | Age: 45
End: 2020-12-15
Payer: COMMERCIAL

## 2020-12-15 VITALS — SYSTOLIC BLOOD PRESSURE: 122 MMHG | DIASTOLIC BLOOD PRESSURE: 81 MMHG

## 2020-12-15 DIAGNOSIS — Z01.419 ENCOUNTER FOR GYNECOLOGICAL EXAMINATION (GENERAL) (ROUTINE) WITHOUT ABNORMAL FINDINGS: Primary | ICD-10-CM

## 2020-12-15 DIAGNOSIS — N93.9 ABNORMAL UTERINE BLEEDING: ICD-10-CM

## 2020-12-15 PROCEDURE — 99396 PREV VISIT EST AGE 40-64: CPT | Performed by: OBSTETRICS & GYNECOLOGY

## 2020-12-15 RX ORDER — SPIRONOLACTONE 25 MG/1
TABLET ORAL DAILY
COMMUNITY
End: 2022-07-08

## 2021-11-10 ENCOUNTER — TELEPHONE (OUTPATIENT)
Dept: OBGYN CLINIC | Age: 46
End: 2021-11-10

## 2021-11-10 NOTE — TELEPHONE ENCOUNTER
Call received at 3:45 PM      51year old patient last seen in the office on 12/15/2020 and has next appointment for annual exam on 12/17/2021    Patient calling to say that she has a button size lump on her labia that she has had for about a weeks and it was sensitive and now has bleed a little but is still painful and would like to be seen    Patient was placed on the scheduled to be seen on 11/18/2021 at 10:00am    Patient verbalized understanding.

## 2021-11-18 ENCOUNTER — OFFICE VISIT (OUTPATIENT)
Dept: OBGYN CLINIC | Age: 46
End: 2021-11-18
Payer: COMMERCIAL

## 2021-11-18 VITALS
DIASTOLIC BLOOD PRESSURE: 73 MMHG | HEART RATE: 81 BPM | WEIGHT: 195 LBS | BODY MASS INDEX: 32.95 KG/M2 | SYSTOLIC BLOOD PRESSURE: 126 MMHG

## 2021-11-18 DIAGNOSIS — N89.8 VAGINAL DISCHARGE: ICD-10-CM

## 2021-11-18 DIAGNOSIS — N90.89 VULVAR LESION: ICD-10-CM

## 2021-11-18 DIAGNOSIS — R23.9 SKIN CHANGE: ICD-10-CM

## 2021-11-18 DIAGNOSIS — N90.89 VULVAL LESION: Primary | ICD-10-CM

## 2021-11-18 PROCEDURE — 99213 OFFICE O/P EST LOW 20 MIN: CPT | Performed by: OBSTETRICS & GYNECOLOGY

## 2021-11-18 RX ORDER — CLINDAMYCIN PHOSPHATE 11.9 MG/ML
SOLUTION TOPICAL
Qty: 60 ML | Refills: 1 | Status: SHIPPED | OUTPATIENT
Start: 2021-11-18 | End: 2022-07-08

## 2021-11-18 NOTE — PROGRESS NOTES
[address] [facility]  [email or web address]  [phone]    Jimenze Roman MD, [address] [facility]       OB/GYN Problem visit    Chief Complaint:   Chief Complaint   Patient presents with    Other     vaginal lump       Last or next WWE is: 12/15/2020    History of Present Illness: This is a new problem being evaluated by this provider. She has bump on her labia minora; it was painful, then it started to bleed; she feels like it ruptured about a week and a half ago. The pain is 80% gone today. She still has slight pain when touching the area. The patient is a 51 y.o.  female. She reports the symptoms are has improved. Aggravating factors include none. Alleviating factors include none. Ho sebaceous cysts and boils: buttocks/inguinal area. She reports no new sexual partners. She does not have other concerns. LMP: No LMP recorded. PFSH:  Past Medical History:   Diagnosis Date    Abnormal mammogram of left breast 2017    Possible increasing density, left breast- add'l views completed 17 BIRADS 3 -f/u 6 mo    Anxiety     Ectopic pregnancy     H/O mammogram 2018; 07/10/2018    BIRADS 3/ Probably benign; BIRADS 1/Several simple cyst noted, Benign    History of mammogram 5/26/15    Negative    Hx of mammogram 2019; 12/15/20    negative; BI-RADS 1: Negative    Hyperlipidemia     Pap smear for cervical cancer screening 14; 19    Negative, HPV negative; Neg/Neg HPV    PCOS (polycystic ovarian syndrome)      Past Surgical History:   Procedure Laterality Date    HX ABDOMINOPLASTY      HX BREAST BIOPSY Left     nnegative pathology    HX  SECTION      HX GYN      ablation of endometriosis (clarify with pt at nv) ? op note vs endo ablation    MA LAP,TUBAL CAUTERY       Family History   Problem Relation Age of Onset    Breast Cancer Other     Heart Attack Other     Heart Disease Other     Elevated Lipids Other     Hypertension Other     Other Other         lung cancer    No Known Problems Mother     Breast Cancer Maternal Grandmother     No Known Problems Father      Social History     Tobacco Use    Smoking status: Never Smoker    Smokeless tobacco: Never Used   Substance Use Topics    Alcohol use: No    Drug use: No     Allergies   Allergen Reactions    Penicillins Rash     Current Outpatient Medications   Medication Sig    OTHER cbd    clindamycin (CLEOCIN T) 1 % external solution use thin film on affected area bid IN AFFECTED AREAS    escitalopram oxalate (LEXAPRO) 10 mg tablet TAKE 1 TABLET BY MOUTH EVERY DAY    tiZANidine (ZANAFLEX) 6 mg capsule     pravastatin (PRAVACHOL) 20 mg tablet     metFORMIN ER (GLUCOPHAGE XR) 500 mg tablet     MELATONIN PO Take  by mouth.  spironolactone (ALDACTONE) 25 mg tablet Take  by mouth daily. (Patient not taking: Reported on 11/18/2021)    sertraline (ZOLOFT) 50 mg tablet TAKE 1 TABLET BY MOUTH EVERY DAY (Patient not taking: Reported on 11/18/2021)    hydrOXYzine HCl (ATARAX) 10 mg tablet TAKE 1 TABLET BY MOUTH 3 TIMES A DAY AS NEEDED FOR ITCHING (Patient not taking: Reported on 11/18/2021)    ADVAIR DISKUS 250-50 mcg/dose diskus inhaler INHALE TWICE A DAY AS DIRECTED (Patient not taking: Reported on 11/18/2021)    PROAIR HFA 90 mcg/actuation inhaler INHALE 2 PUFFS EVERY 4 HOURS AS NEEDED (Patient not taking: Reported on 11/18/2021)    LO LOESTRIN FE 1 mg-10 mcg (24)/10 mcg (2) tab Take 1 Tab by mouth daily. [facility]#[id number] PCN#CN YZK#[id number] PT#[id number] (Patient not taking: Reported on 11/18/2021)    DIPHENHYDRAMINE HCL (BENADRYL PO) Take  by mouth. (Patient not taking: Reported on 11/18/2021)    CITALOPRAM HYDROBROMIDE (CELEXA PO) Take 10 mg by mouth daily. (Patient not taking: Reported on 11/18/2021)     No current facility-administered medications for this visit.        Review of Systems:  History obtained from the patient  Constitutional: negative for fevers, chills and weight loss  ENT ROS: negative for - hearing change, oral lesions or visual changes  Respiratory: negative for cough, wheezing or dyspnea on exertion  Cardiovascular: negative for chest pain, irregular heart beats, exertional chest pressure/discomfort  Gastrointestinal: negative for dysphagia, nausea and vomiting  Genito-Urinary ROS:  see HPI  Inteument/breast: negative for rash, breast lump and nipple discharge  Musculoskeletal:negative for stiff joints, neck pain and muscle weakness  Endocrine ROS: negative for - breast changes, galactorrhea or temperature intolerance  Hematological and Lymphatic ROS: negative for - blood clots, bruising or swollen lymph nodes    Physical Exam:  Visit Vitals  /73   Pulse 81   Wt 195 lb (88.5 kg)   BMI 32.95 kg/m²       GENERAL: alert, well appearing, and in no distress  HEAD: normocephalic, atraumatic. ABDOMEN: soft, nontender, nondistended, no masses or organomegaly   EGBUS: no lesions, no inflammation, no masses, healing papules  Labia minora: no lesions on right  VULVA: normal appearing vulva with no masses, tenderness or lesions  VAGINA: normal appearing vagina with normal color, no lesions, white discharge  CERVIX: normal appearing cervix without discharge or lesions, non tender  UTERUS: uterus is normal size, shape, consistency and nontender   ADNEXA: normal adnexa in size, nontender and no masses  NEURO: alert, oriented, normal speech  SKIN : healing skin changes b/l inguinal area and buttocks    Assessment:  Encounter Diagnoses   Name Primary?  Vulval lesion Yes    Vaginal discharge     Vulvar lesion     Comment: resolved, right labia minora      Skin change     Comment: suspect HS       Plan:  The patient is advised that she should contact the office if she does not note improvement or if symptoms recur  Recommend follow up with PCP for non-gynecologic complaints and chronic medical problems.     She should contact our office with any questions or concerns  She could keep her routine annual exam appointment. Topical txt  Disc s/sx of HS, consider salbador fu if NI  Sitz bath prn  Notify MD if NI  Disc sx of std vs sebaceous cyst vs HS vs abscess    On this date, 11/18/2021,  I have spent 20 minutes reviewing previous notes, test results and face to face with the patient discussing the diagnosis and importance of compliance with the treatment plan as well as documenting on the day of the visit. Orders Placed This Encounter    clindamycin (CLEOCIN T) 1 % external solution       No results found for this visit on 11/18/21.

## 2021-11-23 LAB
A VAGINAE DNA VAG QL NAA+PROBE: NORMAL SCORE
BVAB2 DNA VAG QL NAA+PROBE: NORMAL SCORE
C ALBICANS DNA VAG QL NAA+PROBE: NEGATIVE
C GLABRATA DNA VAG QL NAA+PROBE: NEGATIVE
C TRACH DNA VAG QL NAA+PROBE: NEGATIVE
HSV1 DNA SPEC QL NAA+PROBE: NEGATIVE
HSV2 DNA SPEC QL NAA+PROBE: NEGATIVE
MEGA1 DNA VAG QL NAA+PROBE: NORMAL SCORE
N GONORRHOEA DNA VAG QL NAA+PROBE: NEGATIVE
SPECIMEN STATUS REPORT, ROLRST: NORMAL
T VAGINALIS DNA VAG QL NAA+PROBE: NEGATIVE

## 2021-11-24 NOTE — PROGRESS NOTES
The results are normal, no clear evidence of vaginal infection  [facility] message sent if active. Recommend f/u if still having symptoms/problems or has additional concerns.

## 2022-07-06 ENCOUNTER — PATIENT MESSAGE (OUTPATIENT)
Dept: OBGYN CLINIC | Age: 47
End: 2022-07-06

## 2022-07-08 ENCOUNTER — OFFICE VISIT (OUTPATIENT)
Dept: OBGYN CLINIC | Age: 47
End: 2022-07-08

## 2022-07-08 VITALS
BODY MASS INDEX: 36.5 KG/M2 | SYSTOLIC BLOOD PRESSURE: 129 MMHG | HEART RATE: 116 BPM | DIASTOLIC BLOOD PRESSURE: 85 MMHG | WEIGHT: 216 LBS

## 2022-07-08 DIAGNOSIS — Z11.51 ENCOUNTER FOR SCREENING FOR HUMAN PAPILLOMAVIRUS (HPV): ICD-10-CM

## 2022-07-08 DIAGNOSIS — Z12.4 ENCOUNTER FOR PAPANICOLAOU SMEAR FOR CERVICAL CANCER SCREENING: ICD-10-CM

## 2022-07-08 DIAGNOSIS — Z01.419 ENCOUNTER FOR GYNECOLOGICAL EXAMINATION (GENERAL) (ROUTINE) WITHOUT ABNORMAL FINDINGS: Primary | ICD-10-CM

## 2022-07-08 PROCEDURE — 99396 PREV VISIT EST AGE 40-64: CPT | Performed by: OBSTETRICS & GYNECOLOGY

## 2022-07-08 RX ORDER — VENLAFAXINE HYDROCHLORIDE 150 MG/1
CAPSULE, EXTENDED RELEASE ORAL DAILY
COMMUNITY

## 2022-07-08 NOTE — PROGRESS NOTES
[address] [facility]-GYN  [email or web address]  [phone]    Nelly Chahal MD, [facility]       Annual Gynecologic Exam  WWE 40-60  Chief Complaint   Patient presents with    Well Woman       Rita Villaseñor is a 49 y.o.  WHITE/NON-  female who presents for an annual exam.  No LMP recorded. .    Patient has the following concerns today: none. Concerned about weight gain. More stress, busy job, eating out, can't exercise due to foot pain. Menstrual status:  She does not report dysmenorrhea/painful menses. She does not report heavy menses. She does not report irregular bleeding. Sexual history and Contraception:  Social History     Substance and Sexual Activity   Sexual Activity Yes    Partners: Male    Birth control/protection: Surgical    Comment: tubiligation       She does not reports new sexual partner(s) in the last year. Preventive Medicine History:  Her most recent Pap smear result: normal was obtained in 2019    Her most recent HR HPV screen was Negative obtained in     She does not have a history of KIKO 2, 3 or cervical cancer. Breast health:  [facility] Results (most recent):  Results from [place] encounter on 12/15/20    [facility] [address] W MAMMO BI SCREENING INCL CAD    Narrative  STUDY: Bilateral digital screening mammogram with 3-D tomosynthesis    INDICATION:  Screening. COMPARISON: [id number]    BREAST COMPOSITION: There are scattered areas of fibroglandular density. FINDINGS: Bilateral digital screening mammography was performed and is  interpreted in conjunction with a computer assisted detection (CAD) system. Additionally, tomosynthesis of both breasts in the CC and MLO projections was  performed. No suspicious masses or calcifications are identified. There has been  no significant change. Impression  IMPRESSION:  BI-RADS 1: Negative. No mammographic evidence of malignancy.     RECOMMENDATIONS:  Next screening mammogram is recommended in one year. The patient will be notified of these results. Last mammogram: was normal.   Breast cancer family updated: see FH. Past Medical History:   Diagnosis Date    Abnormal mammogram of left breast 2017    Possible increasing density, left breast- add'l views completed 17 BIRADS 3 -f/u 6 mo    Anxiety     Ectopic pregnancy     H/O mammogram 2018; 07/10/2018    BIRADS 3/ Probably benign; BIRADS 1/Several simple cyst noted, Benign    History of mammogram 5/26/15    Negative    Hx of mammogram 2019; 12/15/20    negative; BI-RADS 1: Negative    Hyperlipidemia     Pap smear for cervical cancer screening 14; 19    Negative, HPV negative; Neg/Neg HPV    PCOS (polycystic ovarian syndrome)      OB History    Para Term  AB Living   3 2 2   1 2   SAB IAB Ectopic Molar Multiple Live Births       1     2      # Outcome Date GA Lbr Panfilo/2nd Weight Sex Delivery Anes PTL Lv   3 Ectopic            2 Term            1 Term               Obstetric Comments   Menarche: 10   LMP: 15. # of Children:  2. Age at Delivery of First Child:  26.   Hysterectomy/oophorectomy:  NO/NO. Breast Bx:  no.  Hx of Breast Feeding:  yes. BCP:  yes. Hormone therapy:  no.        Past Surgical History:   Procedure Laterality Date    HX ABDOMINOPLASTY      HX BREAST BIOPSY Left     nnegative pathology    HX  SECTION      HX GYN      ablation of endometriosis (clarify with pt at nv) ? op note vs endo ablation    IN LAP,TUBAL CAUTERY       Family History   Problem Relation Age of Onset    Breast Cancer Other     Heart Attack Other     Heart Disease Other     Elevated Lipids Other     Hypertension Other     Other Other         lung cancer    No Known Problems Mother     Breast Cancer Maternal Grandmother     No Known Problems Father      Social History     Socioeconomic History    Marital status:      Spouse name: Not on file    Number of children: Not on file    Years of education: Not on file    Highest education level: Not on file   Occupational History    Not on file   Tobacco Use    Smoking status: Never Smoker    Smokeless tobacco: Never Used   Substance and Sexual Activity    Alcohol use: No    Drug use: No    Sexual activity: Yes     Partners: Male     Birth control/protection: Surgical     Comment: tubiligation   Other Topics Concern    Not on file   Social History Narrative    Not on file     Social Determinants of Health     Financial Resource Strain:     Difficulty of Paying Living Expenses: Not on file   Food Insecurity:     Worried About Running Out of Food in the Last Year: Not on file    Chan of Food in the Last Year: Not on file   Transportation Needs:     Lack of Transportation (Medical): Not on file    Lack of Transportation (Non-Medical): Not on file   Physical Activity:     Days of Exercise per Week: Not on file    Minutes of Exercise per Session: Not on file   Stress:     Feeling of Stress : Not on file   Social Connections:     Frequency of Communication with Friends and Family: Not on file    Frequency of Social Gatherings with Friends and Family: Not on file    Attends [employer] Services: Not on file    Active Member of [address] [place] [address] [employer] or Organizations: Not on file    Attends Club or Organization Meetings: Not on file    Marital Status: Not on file   Intimate Partner Violence:     Fear of Current or Ex-Partner: Not on file    Emotionally Abused: Not on file    Physically Abused: Not on file    Sexually Abused: Not on file   Housing Stability:     Unable to Pay for Housing in the Last Year: Not on file    Number of [place] in the Last Year: Not on file    Unstable Housing in the Last Year: Not on file       Allergies   Allergen Reactions    Penicillins Rash       Current Outpatient Medications   Medication Sig    venlafaxine-SR (Effexor XR) 150 mg capsule Take  by mouth daily.     tiZANidine (ZANAFLEX) 6 mg capsule     pravastatin (PRAVACHOL) 20 mg tablet     metFORMIN ER (GLUCOPHAGE XR) 500 mg tablet     DIPHENHYDRAMINE HCL (BENADRYL PO) Take  by mouth. (Patient not taking: Reported on 11/18/2021)    MELATONIN PO Take  by mouth. (Patient not taking: Reported on 7/8/2022)     No current facility-administered medications for this visit.        Patient Active Problem List   Diagnosis Code    PCOS (polycystic ovarian syndrome) E28.2       Review of Systems - History obtained from the patient  Constitutional/general, HEENT, CV, Resp, GI, MSK, Neuro, Psych, Heme/lymph, Skin, Breast ROS: no significant complaints except as noted on HPI     Physical Exam  Visit Vitals  /85   Pulse (!) 116   Wt 216 lb (98 kg)   BMI 36.50 kg/m²       Constitutional  · Appearance: well-nourished, well developed, alert, in no acute distress    HENT  · Head and Face: appears normal    Neck  · Inspection/Palpation: normal appearance, no masses or tenderness  · Lymph Nodes: no lymphadenopathy present  · Thyroid: gland size normal, nontender, no nodules or masses present on palpation    Chest  · Respiratory Effort: breathing unlabored  · Auscultation: normal breath sounds    Cardiovascular  · Heart:  · Auscultation: regular rate and rhythm without murmur    Breasts  · Inspection of Breasts: breasts symmetrical, no skin changes, no discharge present, nipple appearance normal, no skin retraction present  · Palpation of Breasts and Axillae: no masses present on palpation, no breast tenderness  · Axillary Lymph Nodes: no lymphadenopathy present    Gastrointestinal  · Abdominal Examination: abdomen non-tender to palpation, normal bowel sounds, no masses present  · Liver and spleen: no hepatomegaly present, spleen not palpable  · Hernias: no hernias identified    Genitourinary  · External Genitalia: normal appearance for age, no discharge present, no tenderness present, no inflammatory lesions present, no masses present, without atrophy present  · Vagina: normal vaginal vault without central or paravaginal defects, no discharge present, no inflammatory lesions present, no masses present  · Bladder: non-tender to palpation  · Urethra: appears normal  · Cervix: normal   · Uterus: normal size, shape and consistency  · Adnexa: no adnexal tenderness present, no adnexal masses present  · Perineum: perineum within normal limits, no evidence of trauma, no rashes or skin lesions present  · Anus: anus within normal limits, no hemorrhoids present  · Inguinal Lymph Nodes: no lymphadenopathy present    Skin  · General Inspection: no rash, no lesions identified    Neurologic/Psychiatric  · Mental Status:  · Orientation: grossly oriented to person, place and time  · Mood and Affect: mood normal, affect appropriate    Assessment:  49 y.o.  for well woman exam  Encounter Diagnoses   Name Primary?  Encounter for gynecological examination (general) (routine) without abnormal findings Yes    Encounter for Papanicolaou smear for cervical cancer screening     Encounter for screening for human papillomavirus (HPV)        Plan:  The patient was counseled about healthy lifestyle, disease prevention, and bone protection. We discussed current self breast exam and mammogram recommendations  We discussed current pap smear and HR HPV testing guidelines  I recommended follow up in one year for routine annual gynecologic exam or sooner if needed  I recommended follow up with a primary care physician for chronic medical problems and evaluation of non-gynecologic concerns and to please contact our office with any GYN questions or concerns. Healthy you handout, rec healthy diet/regular exercise.         Folllow up:  [x] return for annual well woman exam in one year or sooner if she is having problems  [] follow up and ultrasound  [x] mammogram  [] 6 months  [] 3 months  [] 1 month    Orders Placed This Encounter    PAP IG, APTIMA HPV AND RFX 16/18,45 ([id number]) No results found for any visits on 07/08/22.

## 2022-07-12 ENCOUNTER — TELEPHONE (OUTPATIENT)
Dept: OBGYN CLINIC | Age: 47
End: 2022-07-12

## 2022-07-12 DIAGNOSIS — R92.8 ABNORMAL MAMMOGRAM: Primary | ICD-10-CM

## 2022-07-12 NOTE — TELEPHONE ENCOUNTER
----- Message from Gutierrez Sood MD sent at 7/11/2022  5:43 PM EDT -----  Abnormal, needs follow up within two weeks. Order: 3-D DIAGNOSTIC mammogram for right side or requested imaging per radiology. See below  TICKLE until fu completed and in chart. Notify patient if not done by radiology, [address] not read or not [facility] active. Update PMH/: include: ABNORMAL, date of imaging (mm/dd/yy)  If not bilateral: record side of imaging  Patient will be recalled for a diagnostic right mammogram and possible  ultrasound.

## 2022-07-12 NOTE — TELEPHONE ENCOUNTER
Follow up call to Ms. Emilie Chavez Pt verified self and birth date for privacy precautions. Patient was advised of incomplete imagining from mammogram. Patient notified that central scheduling will be calling to schedule right breast ultrasound. . Ms. Zheng Dong acknowledged understanding and all questions were answered to patients satisfaction. Patient requesting phone from MD.  No further questions or concerns at this time.

## 2022-07-13 LAB
CYTOLOGIST CVX/VAG CYTO: NORMAL
CYTOLOGY CVX/VAG DOC CYTO: NORMAL
CYTOLOGY CVX/VAG DOC THIN PREP: NORMAL
CYTOLOGY HISTORY:: NORMAL
DX ICD CODE: NORMAL
HPV I/H RISK 4 DNA CVX QL PROBE+SIG AMP: NEGATIVE
Lab: NORMAL
OTHER STN SPEC: NORMAL
STAT OF ADQ CVX/VAG CYTO-IMP: NORMAL

## 2022-07-13 NOTE — PROGRESS NOTES
Normal pap smear, message sent if [facility] active. Update PMH/: include: Date of pap, Cytology: wnl. For HR HPV results: list NEG or POS, when done.

## 2022-07-19 ENCOUNTER — HOSPITAL ENCOUNTER (OUTPATIENT)
Dept: MAMMOGRAPHY | Age: 47
Discharge: HOME | End: 2022-07-19
Attending: OBSTETRICS & GYNECOLOGY
Payer: COMMERCIAL

## 2022-07-19 DIAGNOSIS — R92.8 ABNORMAL MAMMOGRAM: ICD-10-CM

## 2022-07-19 PROCEDURE — 77061 BREAST TOMOSYNTHESIS UNI: CPT

## 2022-07-19 PROCEDURE — 76642 ULTRASOUND BREAST LIMITED: CPT

## 2022-07-20 NOTE — PROGRESS NOTES
Rt MMG birads 3. Please update chart per protocol. [facility] message sent if active. Update PMH and HM: include date of imaging (mm/dd/yy)  If not bilateral: record side of imaging  Tickle: Right breast ultrasound with possible mammography in 6 months.

## 2022-07-21 NOTE — PROGRESS NOTES
Breast US  birads 3. Please update chart per protocol. Notes: 2. Probably benign clusters of cysts in the right breast 10-11:00.     Recommendation:  Right breast ultrasound with possible mammography in 6 months. tickle  [address] message sent if active.   Update PMH and : include date of imaging (mm/dd/yy)  If not bilateral: record side of imaging

## 2023-01-20 ENCOUNTER — HOSPITAL ENCOUNTER (OUTPATIENT)
Dept: MAMMOGRAPHY | Age: 48
Discharge: HOME | End: 2023-01-20
Attending: OBSTETRICS & GYNECOLOGY
Payer: COMMERCIAL

## 2023-01-20 DIAGNOSIS — R92.8 ABNORMAL MAMMOGRAM: ICD-10-CM

## 2023-01-20 PROCEDURE — 76642 ULTRASOUND BREAST LIMITED: CPT

## 2023-01-25 NOTE — PROGRESS NOTES
Rt breast US birads 2/3: cluster of cysts. Please update chart per protocol. [facility] message sent if active.   Update PMH and HM: include date of imaging (mm/dd/yy)  If not bilateral: record side of imaging  Diag mmg and us 6mos tickle right, add screening mmg on left if due

## 2023-01-25 NOTE — PROGRESS NOTES
Written by Jamie Pabon MD on 1/24/2023  9:59 PM EST View Full Comments  Seen by patient Jo Moscoso on 1/24/2023 10:01 PM    Updated PMH    Tickled    Pt scheduled for bilateral dx mammo & right breast us on 8/18/23

## 2023-04-22 DIAGNOSIS — R92.8 ABNORMAL MAMMOGRAM OF LEFT BREAST: Primary | ICD-10-CM

## 2023-04-22 DIAGNOSIS — N63.0 BREAST NODULE: ICD-10-CM

## 2023-04-23 DIAGNOSIS — R92.8 ABNORMAL MAMMOGRAM OF LEFT BREAST: Primary | ICD-10-CM

## 2023-05-20 RX ORDER — METFORMIN HYDROCHLORIDE 500 MG/1
TABLET, EXTENDED RELEASE ORAL
COMMUNITY
Start: 2013-11-05

## 2023-05-20 RX ORDER — PRAVASTATIN SODIUM 20 MG
TABLET ORAL
COMMUNITY
Start: 2013-12-30

## 2023-05-20 RX ORDER — TIZANIDINE HYDROCHLORIDE 6 MG/1
CAPSULE, GELATIN COATED ORAL
COMMUNITY
Start: 2013-12-13

## 2023-05-20 RX ORDER — VENLAFAXINE HYDROCHLORIDE 150 MG/1
CAPSULE, EXTENDED RELEASE ORAL DAILY
COMMUNITY

## 2023-09-06 ENCOUNTER — OFFICE VISIT (OUTPATIENT)
Age: 48
End: 2023-09-06
Payer: COMMERCIAL

## 2023-09-06 VITALS
DIASTOLIC BLOOD PRESSURE: 83 MMHG | SYSTOLIC BLOOD PRESSURE: 135 MMHG | BODY MASS INDEX: 33.43 KG/M2 | HEIGHT: 64 IN | WEIGHT: 195.8 LBS | HEART RATE: 137 BPM

## 2023-09-06 DIAGNOSIS — Z01.419 ENCOUNTER FOR GYNECOLOGICAL EXAMINATION: Primary | ICD-10-CM

## 2023-09-06 PROCEDURE — 99396 PREV VISIT EST AGE 40-64: CPT | Performed by: OBSTETRICS & GYNECOLOGY

## 2023-09-06 RX ORDER — ATORVASTATIN CALCIUM 10 MG/1
TABLET, FILM COATED ORAL
COMMUNITY
Start: 2023-08-28

## 2023-09-06 RX ORDER — CLINDAMYCIN PHOSPHATE 10 MG/G
GEL TOPICAL
COMMUNITY
Start: 2023-06-23

## 2023-09-06 RX ORDER — SEMAGLUTIDE 1.34 MG/ML
INJECTION, SOLUTION SUBCUTANEOUS
COMMUNITY
Start: 2023-02-28

## 2023-09-06 RX ORDER — SPIRONOLACTONE 50 MG/1
50 TABLET, FILM COATED ORAL DAILY
COMMUNITY
Start: 2023-07-10

## 2023-09-06 NOTE — PROGRESS NOTES
[address] [facility]  [email or web address]  [phone]    Mary Grace Martinez MD, [place]        Annual Gynecologic Exam:  Chief Complaint   Patient presents with    Annual Exam       Karen Schaffer is a ,  51 y.o. female   Patient's last menstrual period was 10/02/2022. She presents for her annual checkup. She is having problems - no menses x 10/22  Ho HS? Hot flashes x 10 years. Losing weight on meds. Exercising less to plantar fasciitis. Per Rooming Note:  Patient's last menstrual period was 10/02/2022. Her periods are none in flow since 10/2/2022  She does not have dysmenorrhea. Problems: problems - no cycle since 10/2/2022  Birth Control: tubal ligation. Last Pap: normal obtained 2022  She does not have a history of IMELDA 2, 3 or cervical cancer. Last Mammogram: had her mammogram today in our office. Last Bone Density: never obtained   Last colonoscopy: never obtained - pt has a PCP just saw PCP in 2023     Pt denies any cold sx or fever the past 24 hours. Sexual history and Contraception:    Social History     Substance and Sexual Activity   Sexual Activity Yes    Partners: Male    Birth control/protection: Surgical    Comment: BTL      Past Medical History:   Diagnosis Date    Abnormal mammogram 2022    right breast - incomplete - additional views needed    Abnormal mammogram of left breast 2017    Possible increasing density, left breast- add'l views completed 17 BIRADS 3 -f/u 6 mo    Anxiety     Autoimmune disorder ([address])     Pcos    Ectopic pregnancy     H/O diagnostic ultrasound 2023    Rt breast US birads 2/3: cluster of cysts.     H/O mammogram 2022    Right mmg birads 3 - tickeled repeat ultrasound and poss margarita in 6 months    H/O mammogram 2018; 07/10/2018    BIRADS 3/ Probably benign; BIRADS 1/Several simple cyst noted, Benign    History of mammogram 2015    Negative    Hx of mammogram 2019; 12/15/20

## 2023-09-06 NOTE — PROGRESS NOTES
Tanvi Mullen is a 51 y.o. female returns for an annual exam     Chief Complaint   Patient presents with    Annual Exam       Patient's last menstrual period was 10/02/2022. Her periods are none in flow since 10/2/2022  She does not have dysmenorrhea. Problems: problems - no cycle since 10/2/2022  Birth Control: tubal ligation. Last Pap: normal obtained 7/2022  She does not have a history of IMELDA 2, 3 or cervical cancer. Last Mammogram: had her mammogram today in our office. Last Bone Density: never obtained   Last colonoscopy: never obtained - pt has a PCP just saw PCP in 6/2023    Pt denies any cold sx or fever the past 24 hours.        Examination chaperoned by Aleksey Melendez MA.

## 2023-09-07 ENCOUNTER — TELEPHONE (OUTPATIENT)
Age: 48
End: 2023-09-07

## 2023-09-07 DIAGNOSIS — R92.1 BREAST CALCIFICATION, RIGHT: ICD-10-CM

## 2023-09-07 DIAGNOSIS — N64.89 BREASTS ASYMMETRICAL: Primary | ICD-10-CM

## 2023-09-07 NOTE — TELEPHONE ENCOUNTER
PT call returned name and  verified    PT relayed that orders were placed and central scheduling number provided to make appt. Also relayed that for 23 date shows just an us and they cannot pair an us with a mammo. PT states she had a mammo and us on that date. PT still concerned and asking if she has to get these procedures every 6 months and it is believed she needs these, then Remy Wheat has no one ordered a breast biopsy\" and \" if it is just cysts then let me go a year to have these repeated\". PT states this is very cost effective for her. PT still has concerns and wants to send MD a [facility] message.           PATRICK

## 2023-09-07 NOTE — TELEPHONE ENCOUNTER
7/8/22 pt had bilateral screening mammogram; it was incomplete & recommended a right diagnostic mammogram & possible right breast ultrasound    7/19/22 pt had a right diagnostic mammogram & right ultrasound; recommended 6 month fu for right breast ultrasound & possible mammogram    1/20/23 right breast ultrasound; recommended a diagnostic mammogram & right breast ultrasound in 6 months    4/22/23 orders placed for right breast ultrasound (limited)  4/23/23 orders placed for bilateral diagnostic mammogram & right breast ultrasound (complete)    8/18/23 pt was scheduled for bilateral diagnostic mammogram & right breast ultrasound; appointment was made 4/22/23, appointment confirmed 8/16/23, appointment canceled 8/18/23 by patient    9/6/23 bilateral screening mammogram; incomplete, recommended bilateral diagnostic mammogram & possible ultrasound     Pt is due for bilateral diagnostic mammogram & breast ultrasound

## 2023-09-07 NOTE — TELEPHONE ENCOUNTER
PT name and  verified    50 yo last ov 23    PT calling because she has seen her mammo results in [facility]. PT states she read that the results were compared to  and  mammos and was questioning if they compared results to her 23 mammo? PT is also wanting to schedule here at [facility] for follow up mammo and was instructed there was no order placed yet. PT has also sent a [facility] message to MD as well as calling since her mammo 23.         Please advise and see PT my chart message      Thank you

## 2023-09-07 NOTE — TELEPHONE ENCOUNTER
Dr. Porter Chi spoke with pt on the phone & we placed breast referral- [facility] message sent

## 2023-09-11 ENCOUNTER — TELEPHONE (OUTPATIENT)
Age: 48
End: 2023-09-11

## 2023-09-11 ENCOUNTER — TRANSCRIBE ORDERS (OUTPATIENT)
Facility: HOSPITAL | Age: 48
End: 2023-09-11

## 2023-09-11 DIAGNOSIS — N64.89 BREASTS ASYMMETRICAL: Primary | ICD-10-CM

## 2023-09-11 NOTE — TELEPHONE ENCOUNTER
Called patient back as she LVM on nursing VM. She had spoken with Cydney who is going to work on getting patient an apt for [facility]. The patient said her PCP put orders in for this. I told her we would be in touch. She was very appreciative.

## 2023-09-20 ENCOUNTER — HOSPITAL ENCOUNTER (OUTPATIENT)
Facility: HOSPITAL | Age: 48
Discharge: HOME | End: 2023-09-23
Attending: OBSTETRICS & GYNECOLOGY
Payer: COMMERCIAL

## 2023-09-20 DIAGNOSIS — N64.89 BREASTS ASYMMETRICAL: ICD-10-CM

## 2023-09-20 PROCEDURE — 76642 ULTRASOUND BREAST LIMITED: CPT

## 2023-09-20 PROCEDURE — G0279 TOMOSYNTHESIS, MAMMO: HCPCS

## 2023-09-22 NOTE — RESULT ENCOUNTER NOTE
Abnormal, needs follow up within two weeks. Refer for breast bx (did rad do this or do we need to refer to DR CHERRY ANDERSON [facility]?)  TICKLE until fu completed and in chart. Notify patient if not done by radiology, [facility] not read or not [facility] active.    Update PMH/HM: include: ABNORMAL, date of imaging (mm/dd/yy)  If not bilateral: record side of imaging

## 2023-09-25 ENCOUNTER — TELEPHONE (OUTPATIENT)
Age: 48
End: 2023-09-25

## 2023-09-25 NOTE — TELEPHONE ENCOUNTER
----- Message from [facility] sent at 9/25/2023 11:36 AM EDT -----  Regarding: Diagnostic findings   Contact: [phone]  I have no idea. I am pretty sure it was Dr Edinson Aldana:     Can you please check ? They had to schedule me out a ways due to calendar issues and I'm very anxious so I would hate to get there on the 5th and there be a problem .

## 2023-09-25 NOTE — TELEPHONE ENCOUNTER
[facility] & advised we placed a referral for Dr. Urszula Chin to have a breast bx done, asked  if an order for a breast bx also needed to be placed by Dr. Yimi Becerra, she advised if she is seeing Dr. Urszula Chin then no Dr. Yimi Becerra would not need to place breast bx order. Advised  I would send pt a message with information to get scheduled.       Pt has seen [facility] in the past       [address], [address] [place] [address], [address] [place], [place] [phone]

## 2023-09-26 ENCOUNTER — CLINICAL DOCUMENTATION (OUTPATIENT)
Age: 48
End: 2023-09-26

## 2023-09-26 NOTE — TELEPHONE ENCOUNTER
Called & LVM with nurse line for [facility] asking if all orders/referrals were correct & no additional orders from our office would be needed at this time, asked for a call back for clarification

## 2023-09-26 NOTE — PROGRESS NOTES
Dr. Marcela Kiran reviewed the patient's imaging reports and she will need an ultrasound guided biopsy as opposed to a stereotactic biopsy. The patient is scheduled for the biopsy 10/5/23. She was called and notified of the plan.

## 2023-10-05 ENCOUNTER — CLINICAL DOCUMENTATION (OUTPATIENT)
Age: 48
End: 2023-10-05

## 2023-10-05 ENCOUNTER — HOSPITAL ENCOUNTER (OUTPATIENT)
Facility: HOSPITAL | Age: 48
Setting detail: SPECIMEN
Discharge: HOME | End: 2023-10-08

## 2023-10-05 ENCOUNTER — OFFICE VISIT (OUTPATIENT)
Age: 48
End: 2023-10-05

## 2023-10-05 VITALS — WEIGHT: 190 LBS | HEIGHT: 64 IN | BODY MASS INDEX: 32.44 KG/M2

## 2023-10-05 DIAGNOSIS — R92.1 CALCIFICATION OF RIGHT BREAST: ICD-10-CM

## 2023-10-05 DIAGNOSIS — R92.8 ABNORMAL ULTRASOUND OF BREAST: Primary | ICD-10-CM

## 2023-10-05 NOTE — PROGRESS NOTES
HISTORY OF PRESENT ILLNESS  Patrizia Vital is a 51 y.o. female     HPI NEW  (former) patient consult referred by Dr. Rema Oconnor for a new  RIGHT breast mass with calcs seen on screening mammogram.    Here with her   2016 benign LEFT breast bx    Family History:   maternal grandmother had breast cancer dx age 64, survivor  Father  from bone cancer age 61    Mammogram Result (most recent):  ANTOINE MAKAYLA DIGITAL DIAGNOSTIC BILATERAL 2023    Narrative  INDICATION: Evaluate right breast calcifications and bilateral breast  asymmetries. STUDY:  Bilateral Digital diagnostic 3D breast tomosynthesis is performed and  interpreted in conjunction with CAD. Direct comparison is made to multiple prior mammograms. BREAST COMPOSITION: There are scattered fibroglandular densities (approximately  25-50% glandular). FINDINGS: Within the upper outer right breast, there are grouped indeterminate  microcalcifications. In the upper outer quadrant of the right breast, there is a  persistent focal asymmetry. There is a persistent punctate asymmetry within the  lateral left breast.    Targeted sonographic evaluation of the 10:00 position of the right breast is  performed. In the 10:00 position of the right breast, there is a 6 mm x 4 mm  avascular, hypoechoic, nonshadowing lesion, possibly containing  microcalcifications. There is an adjacent cyst measuring several millimeters in  size. Targeted sonographic evaluation of the right axilla was also performed. No  suspicious right axillary lymph node is seen. Targeted sonographic evaluation of the 3 o'clock position of the left breast is  performed. In the 3:00 position of the left breast, there are several clustered  microcysts with a conglomerate measurement of 8 mm x 4 mm. Impression  Birads 4. Right breast suspicious abnormality(ies). Right breast  upper outer quadrant indeterminate grouped microcalcifications.  6 mm x 4 mm  indeterminate hypoechoic lesion with

## 2023-10-05 NOTE — PROGRESS NOTES
[facility]   OFFICE PROCEDURE PROGRESS NOTE        Chart reviewed for the following:   Dalia ALMENDAREZ , have reviewed the History, Physical and updated the Allergic reactions for [facility]     TIME OUT performed immediately prior to start of procedure:   Dalia ALMENDAREZ, have performed the following reviews on [facility] prior to the start of the procedure:            * Patient was identified by name and date of birth   * Agreement on procedure being performed was verified  * Risks and Benefits explained to the patient  * Procedure site verified and marked as necessary  * Patient was positioned for comfort  * Consent was signed and verified     Time: 10:21 am      Date of procedure: 10/5/2023    Procedure performed by:  Dalia Wadsworth MD    Provider assisted by: Kathleen Monroy MA    Patient assisted by: self    How tolerated by patient: tolerated the procedure well with no complications    Post Procedural Pain Scale: 0    Comments: none

## 2023-10-12 ENCOUNTER — OFFICE VISIT (OUTPATIENT)
Age: 48
End: 2023-10-12
Payer: COMMERCIAL

## 2023-10-12 ENCOUNTER — HOSPITAL ENCOUNTER (OUTPATIENT)
Facility: HOSPITAL | Age: 48
End: 2023-10-12
Payer: COMMERCIAL

## 2023-10-12 ENCOUNTER — HOSPITAL ENCOUNTER (OUTPATIENT)
Facility: HOSPITAL | Age: 48
Setting detail: SPECIMEN
Discharge: HOME | End: 2023-10-15

## 2023-10-12 VITALS — BODY MASS INDEX: 33.29 KG/M2 | HEIGHT: 64 IN | WEIGHT: 195 LBS

## 2023-10-12 DIAGNOSIS — R92.8 ABNORMAL MAMMOGRAM: ICD-10-CM

## 2023-10-12 DIAGNOSIS — R92.1 CALCIFICATION OF RIGHT BREAST: Primary | ICD-10-CM

## 2023-10-12 DIAGNOSIS — R92.8 ABNORMAL MAMMOGRAM OF RIGHT BREAST: ICD-10-CM

## 2023-10-12 DIAGNOSIS — R92.1 BREAST CALCIFICATION, RIGHT: ICD-10-CM

## 2023-10-12 PROCEDURE — 99213 OFFICE O/P EST LOW 20 MIN: CPT | Performed by: SURGERY

## 2023-10-12 PROCEDURE — 19081 BX BREAST 1ST LESION STRTCTC: CPT

## 2023-10-12 PROCEDURE — 77065 DX MAMMO INCL CAD UNI: CPT

## 2023-10-12 NOTE — PROGRESS NOTES
right breast        2.  Abnormal mammogram  Surgical Pathology      Total time spent on chart review and patient visit: 20 minutes     RIGHT breast calcs biopsied

## 2023-10-12 NOTE — PROGRESS NOTES
10:30 a.m. Patient received for a right breast stereotactic biopsy/breast clip. Procedure explained to patient as well as risks associated with procedure. Discharge instructions briefly discussed. Patient expects to be contacted by phone with pathology results. 10:35 a.m. Time-out performed with all participants present. 11:30 a.m. Patient tolerated procedure well with minimal bleeding. Pressure held for 5 minutes; dressing applied to biopsy site (steri-strip, gauze, and tegaderm). Patient then sent for a post biopsy mammogram.  Discharge instructions reviewed with patient and a copy given to patient. Dressing dry and intact on discharge; an ice pack applied over dressing. Patient expects to be contacted by phone with pathology results.

## 2023-10-16 ENCOUNTER — TELEPHONE (OUTPATIENT)
Age: 48
End: 2023-10-16

## 2023-10-16 DIAGNOSIS — D05.11 BREAST NEOPLASM, TIS (DCIS), RIGHT: ICD-10-CM

## 2023-10-17 ENCOUNTER — TELEPHONE (OUTPATIENT)
Age: 48
End: 2023-10-17

## 2023-10-17 NOTE — TELEPHONE ENCOUNTER
Called patient to answer questions about genetics consult prior to scheduling. Reviewed purpose of consult, general information covered in consult, and brief information about sample collection and insurance coverage. Advised patient to call [phone] when she is ready to schedule.

## 2023-10-23 ENCOUNTER — HOSPITAL ENCOUNTER (OUTPATIENT)
Facility: HOSPITAL | Age: 48
Discharge: HOME | End: 2023-10-26
Attending: SURGERY
Payer: COMMERCIAL

## 2023-10-23 ENCOUNTER — CLINICAL DOCUMENTATION (OUTPATIENT)
Age: 48
End: 2023-10-23

## 2023-10-23 ENCOUNTER — TELEPHONE (OUTPATIENT)
Age: 48
End: 2023-10-23

## 2023-10-23 DIAGNOSIS — D05.11 BREAST NEOPLASM, TIS (DCIS), RIGHT: Primary | ICD-10-CM

## 2023-10-23 DIAGNOSIS — D05.11 BREAST NEOPLASM, TIS (DCIS), RIGHT: ICD-10-CM

## 2023-10-23 PROCEDURE — 6360000004 HC RX CONTRAST MEDICATION: Performed by: SURGERY

## 2023-10-23 PROCEDURE — A9585 GADOBUTROL INJECTION: HCPCS | Performed by: SURGERY

## 2023-10-23 PROCEDURE — C8908 MRI W/O FOL W/CONT, BREAST,: HCPCS

## 2023-10-23 RX ORDER — GADOBUTROL 604.72 MG/ML
8 INJECTION INTRAVENOUS
Status: COMPLETED | OUTPATIENT
Start: 2023-10-23 | End: 2023-10-23

## 2023-10-23 RX ADMIN — GADOBUTROL 8 ML: 604.72 INJECTION INTRAVENOUS at 09:30

## 2023-10-23 NOTE — PROGRESS NOTES
I have requested for a magseed x2 for this patient. I will schedule as soon as I have a date @ time for this.

## 2023-10-25 ENCOUNTER — PREP FOR PROCEDURE (OUTPATIENT)
Age: 48
End: 2023-10-25

## 2023-10-25 DIAGNOSIS — D05.11 DUCTAL CARCINOMA IN SITU OF RIGHT BREAST: Primary | ICD-10-CM

## 2023-10-27 ENCOUNTER — HOSPITAL ENCOUNTER (OUTPATIENT)
Facility: HOSPITAL | Age: 48
Discharge: HOME | End: 2023-10-30

## 2023-10-27 ENCOUNTER — OFFICE VISIT (OUTPATIENT)
Age: 48
End: 2023-10-27
Payer: COMMERCIAL

## 2023-10-27 VITALS
HEART RATE: 90 BPM | WEIGHT: 194 LBS | DIASTOLIC BLOOD PRESSURE: 87 MMHG | SYSTOLIC BLOOD PRESSURE: 138 MMHG | BODY MASS INDEX: 32.32 KG/M2 | RESPIRATION RATE: 16 BRPM | HEIGHT: 65 IN | TEMPERATURE: 97.1 F

## 2023-10-27 DIAGNOSIS — D05.11 DUCTAL CARCINOMA IN SITU OF RIGHT BREAST: Primary | ICD-10-CM

## 2023-10-27 DIAGNOSIS — Z80.3 FAMILY HISTORY OF BREAST CANCER: ICD-10-CM

## 2023-10-27 PROCEDURE — 96040 PR MEDICAL GENETICS COUNSELING EACH 30 MINUTES: CPT

## 2023-10-28 LAB
BACTERIA SPEC CULT: NORMAL
BACTERIA SPEC CULT: NORMAL
SERVICE CMNT-IMP: NORMAL

## 2023-10-30 ENCOUNTER — TELEPHONE (OUTPATIENT)
Age: 48
End: 2023-10-30

## 2023-10-30 NOTE — TELEPHONE ENCOUNTER
I spoke with the patient about her surgery date. 11/08/2023. We will move the date if her genetics does not get back before the 8th. She would like to know her BRCA results before her surgery.

## 2023-10-30 NOTE — TELEPHONE ENCOUNTER
Received voicemail from patient today (10/30/2023 9:06am): she would like to push back her surgery date to receive genetic results prior to surgery. Called patient back and left a voicemail advising that I would send a note to Dr. Jozef Pyle and her nurse to make them aware, but that surgery scheduling would need to help her with finding a new surgery date. Advised patient to call main office line [phone] to request new date so Dr. Jozef Pyle' surgery scheduler could reach back out to her.

## 2023-10-30 NOTE — PROGRESS NOTES
History  A three-generation pedigree was collected at the time of the appointment based on patient report in the absence of complete medical records. A full pedigree is available in the electronic medical record for additional details. No consanguinity reported in either maternal or paternal family. No Ashkenazi Mormonism ancestry reported. Children:  Daughter, 19y, no cancer history  Son, 13y, no cancer history  Siblings:  Brother, 52y, no cancer history  Maternal History: Mother, 66y, no cancer history  Grandmother, 80y, breast cancer 63y, bladder cancer 66y  Grandfather,  61y, skin cancer NOS ~60y, small cell lung cancer 61y  Great aunt,  80y, uterine cancer late 76s  Paternal History:  Father,  59y, squamous cell carcinoma of the lung, urinary tract cancer NOS    GENETIC COUNSELING DISCUSSION   Sporadic, familial and hereditary classifications of cancer were discussed with the patient. The majority of cancers are sporadic, due to a combination of factors including age, environmental factors, and the general population high prevalence of cancer. Characteristics within a family which increase the suspicion for a hereditary cancer syndrome include multiple, closely related family members with the same or genetically related cancers, an early age of onset for malignancy, and multiple primary diagnoses within the same individual. 5-10% of cancers are thought to be hereditary. The patient's breast cancer diagnosis at age 52y meets NCCN (Version 2.2024) criteria for genetic testing in the patient. Based on the information available at the time of the appointment, we discussed that neither the paternal and maternal family histories are suspicious for a hereditary cancer syndrome and that the patient meets testing criteria because she was diagnosed with breast cancer under age ?45y.  We discussed the concepts of autosomal dominant inheritance and penetrance and how these factors can impact

## 2023-11-03 ENCOUNTER — HOSPITAL ENCOUNTER (OUTPATIENT)
Facility: HOSPITAL | Age: 48
End: 2023-11-03
Attending: OBSTETRICS & GYNECOLOGY
Payer: COMMERCIAL

## 2023-11-03 DIAGNOSIS — Z85.3 HX OF BREAST CANCER: ICD-10-CM

## 2023-11-03 PROCEDURE — 19281 PERQ DEVICE BREAST 1ST IMAG: CPT

## 2023-11-03 NOTE — PROGRESS NOTES
Patient received for right breast magseed placement. Procedure explained to patient as well as risks associated with procedure. Patient tolerated procedure well.

## 2023-11-06 ENCOUNTER — CLINICAL DOCUMENTATION (OUTPATIENT)
Age: 48
End: 2023-11-06

## 2023-11-06 NOTE — PROGRESS NOTES
Pt 's fmla ppw was received  to be completed for intermittent leave. Pt is currently scheduled for sx on 11/8/ 2023. Pt requested to hold off on sx until genetic results were given. Pt is requested intermittent leave from 12/7/23 thru 4/15/2024. Unsure if fmla ppw should be completed until all is comfirmed.

## 2023-11-07 ENCOUNTER — CLINICAL DOCUMENTATION (OUTPATIENT)
Age: 48
End: 2023-11-07

## 2023-11-07 NOTE — PROGRESS NOTES
Pt was called and calmy  informed that our office is not allowed to give intermittent leave, we are only allowed to a lot recovery time for surgery. Pt was irritate and demanding I listen to her very loudly. I again tried explaining to her our FMLA Procedure. Pt states she would give the doctor a call. I informed pt I would give my manager a call to inform her of her dissatisfaction. Pt requested intermittent leave due to having to have radiation . I explained to the patient that the radiation team would have to fill out ppw for her intermittent leave. I was over talked and screamed at.

## 2023-11-08 ENCOUNTER — TELEPHONE (OUTPATIENT)
Age: 48
End: 2023-11-08

## 2023-11-08 NOTE — TELEPHONE ENCOUNTER
Advised Ms. Amin that her germline genetic testing resulted in a partial sample failure, specifically deletion/duplication analysis is not available. Provided options for obtaining a new sample and Ms. Amin elected to have a blood draw at the [facility] office location today at 4:30 PM.  Stat turnaround time will still be 5-12 days, with estimated return of results between 11/15-11/22.

## 2023-11-14 ENCOUNTER — TELEPHONE (OUTPATIENT)
Age: 48
End: 2023-11-14

## 2023-11-14 NOTE — TELEPHONE ENCOUNTER
Spoke with patient to let her know that I received her [employer] message but was unable to respond due to technical difficulties with [employer]. I will still plan to call her when her results return. The genetic testing lab is still showing her sample as processing, with the same estimated report range of 11/14-11/21.

## 2023-11-16 ENCOUNTER — TELEPHONE (OUTPATIENT)
Age: 48
End: 2023-11-16

## 2023-11-16 NOTE — TELEPHONE ENCOUNTER
Patient Surgery Information Sheet      Patient Name:  Roz Amin  Surgery Date:  November 22, 2023    Type of Surgery:  RIGHT BREAST LUMPECTOMY      ( Pending BRCA testing )    Estimated arrival time 6:00 AM    Arrival time will be confirmed the afternoon before your surgery.    Pre-procedure: Not Applicable    Pre-Operative Testing Department will call to schedule pre-op testing appointment if needed before surgery    Hospital:  [facility]  Address:  [address] [place] [id number]  Check in location:  Through the Main Entrance of [place], Take the elevator on the left side to the second floor on your left as you step out of the elevator    Pre-Operative Instructions:  Will be given at the pre-op appointment.    Special Instructions if needed:     NPO (nothing by mouth) or drinking after midnight the night before Surgery  Patient may shower the morning of, do not use an lotion, deodorant, powders, perfumes or makeup  Patient will need  the morning of surgery     Surgery Scheduler:  Brenda Garcia

## 2023-11-17 ENCOUNTER — CLINICAL DOCUMENTATION (OUTPATIENT)
Age: 48
End: 2023-11-17

## 2023-11-17 ENCOUNTER — TELEPHONE (OUTPATIENT)
Age: 48
End: 2023-11-17

## 2023-11-17 NOTE — TELEPHONE ENCOUNTER
Date: 11/17/2023  Patient Name: Lara Ronquillo  YOB: 1975  Referring Provider: Gerard Holstein, MD    INDICATION: Disclosure of germline genetic test results by phone. INTERVAL HISTORY: Lara Ronquillo is a 51 y.o. female who recently underwent germline genetic testing due to a new breast cancer diagnosis. I spoke with her by phone today to review the results of her genetic testing and their implications. TEST RESULTS: NEGATIVE, no pathogenic variant identified  Testing performed: Breast Cancer STAT Panel  Laboratory: [facility]  A copy of results is available in the patient's record for additional details. Reflex to [facility] Multi Cancer Panel results pending. INTERPRETATION & DISCUSSION:  Genetic testing did not identify any pathogenic variants (mutations) in genes associated with breast cancer predisposition. The results from the Multi Cancer panel are still pending. The treatment and management plan for the patient's current diagnosis should not be changed based on the negative test result. RECOMMENDATION & PLAN:  A full discussion is planned when the remaining test results are available. I will call the patient when reflex results are available. The patient was given time to ask questions and all questions were answered to the best of my ability.     Maritza Trejo MS, [facility]

## 2023-11-21 ENCOUNTER — ANESTHESIA EVENT (OUTPATIENT)
Facility: HOSPITAL | Age: 48
End: 2023-11-21
Payer: COMMERCIAL

## 2023-11-22 ENCOUNTER — TELEPHONE (OUTPATIENT)
Age: 48
End: 2023-11-22

## 2023-11-22 ENCOUNTER — ANESTHESIA (OUTPATIENT)
Facility: HOSPITAL | Age: 48
End: 2023-11-22
Payer: COMMERCIAL

## 2023-11-22 ENCOUNTER — HOSPITAL ENCOUNTER (OUTPATIENT)
Facility: HOSPITAL | Age: 48
Setting detail: OUTPATIENT SURGERY
Discharge: HOME | End: 2023-11-22
Attending: SURGERY | Admitting: SURGERY
Payer: COMMERCIAL

## 2023-11-22 VITALS
SYSTOLIC BLOOD PRESSURE: 123 MMHG | TEMPERATURE: 97.8 F | HEART RATE: 87 BPM | RESPIRATION RATE: 20 BRPM | WEIGHT: 200.62 LBS | DIASTOLIC BLOOD PRESSURE: 83 MMHG | BODY MASS INDEX: 33.9 KG/M2 | OXYGEN SATURATION: 95 %

## 2023-11-22 DIAGNOSIS — D05.11 DUCTAL CARCINOMA IN SITU OF RIGHT BREAST: ICD-10-CM

## 2023-11-22 DIAGNOSIS — D05.11 BREAST NEOPLASM, TIS (DCIS), RIGHT: Primary | ICD-10-CM

## 2023-11-22 PROCEDURE — 6360000002 HC RX W HCPCS: Performed by: NURSE ANESTHETIST, CERTIFIED REGISTERED

## 2023-11-22 PROCEDURE — 2580000003 HC RX 258: Performed by: ANESTHESIOLOGY

## 2023-11-22 PROCEDURE — 2500000003 HC RX 250 WO HCPCS: Performed by: SURGERY

## 2023-11-22 PROCEDURE — 3700000001 HC ADD 15 MINUTES (ANESTHESIA): Performed by: SURGERY

## 2023-11-22 PROCEDURE — 7100000011 HC PHASE II RECOVERY - ADDTL 15 MIN: Performed by: SURGERY

## 2023-11-22 PROCEDURE — 2709999900 HC NON-CHARGEABLE SUPPLY: Performed by: SURGERY

## 2023-11-22 PROCEDURE — 6360000002 HC RX W HCPCS: Performed by: ANESTHESIOLOGY

## 2023-11-22 PROCEDURE — 3600000003 HC SURGERY LEVEL 3 BASE: Performed by: SURGERY

## 2023-11-22 PROCEDURE — 88342 IMHCHEM/IMCYTCHM 1ST ANTB: CPT

## 2023-11-22 PROCEDURE — 3700000000 HC ANESTHESIA ATTENDED CARE: Performed by: SURGERY

## 2023-11-22 PROCEDURE — 88307 TISSUE EXAM BY PATHOLOGIST: CPT

## 2023-11-22 PROCEDURE — 6370000000 HC RX 637 (ALT 250 FOR IP): Performed by: SURGERY

## 2023-11-22 PROCEDURE — 7100000010 HC PHASE II RECOVERY - FIRST 15 MIN: Performed by: SURGERY

## 2023-11-22 PROCEDURE — 2500000003 HC RX 250 WO HCPCS: Performed by: NURSE ANESTHETIST, CERTIFIED REGISTERED

## 2023-11-22 PROCEDURE — 19301 PARTIAL MASTECTOMY: CPT | Performed by: SURGERY

## 2023-11-22 PROCEDURE — 3600000013 HC SURGERY LEVEL 3 ADDTL 15MIN: Performed by: SURGERY

## 2023-11-22 PROCEDURE — 6370000000 HC RX 637 (ALT 250 FOR IP): Performed by: ANESTHESIOLOGY

## 2023-11-22 RX ORDER — ONDANSETRON 2 MG/ML
4 INJECTION INTRAMUSCULAR; INTRAVENOUS
Status: DISCONTINUED | OUTPATIENT
Start: 2023-11-22 | End: 2023-11-22 | Stop reason: HOSPADM

## 2023-11-22 RX ORDER — DROPERIDOL 2.5 MG/ML
0.62 INJECTION, SOLUTION INTRAMUSCULAR; INTRAVENOUS
Status: DISCONTINUED | OUTPATIENT
Start: 2023-11-22 | End: 2023-11-22 | Stop reason: HOSPADM

## 2023-11-22 RX ORDER — ONDANSETRON 2 MG/ML
INJECTION INTRAMUSCULAR; INTRAVENOUS PRN
Status: DISCONTINUED | OUTPATIENT
Start: 2023-11-22 | End: 2023-11-22 | Stop reason: SDUPTHER

## 2023-11-22 RX ORDER — PHENYLEPHRINE HCL IN 0.9% NACL 0.4MG/10ML
SYRINGE (ML) INTRAVENOUS PRN
Status: DISCONTINUED | OUTPATIENT
Start: 2023-11-22 | End: 2023-11-22 | Stop reason: SDUPTHER

## 2023-11-22 RX ORDER — SODIUM CHLORIDE, SODIUM LACTATE, POTASSIUM CHLORIDE, CALCIUM CHLORIDE 600; 310; 30; 20 MG/100ML; MG/100ML; MG/100ML; MG/100ML
INJECTION, SOLUTION INTRAVENOUS CONTINUOUS
Status: DISCONTINUED | OUTPATIENT
Start: 2023-11-22 | End: 2023-11-22 | Stop reason: HOSPADM

## 2023-11-22 RX ORDER — ACETAMINOPHEN 325 MG/1
650 TABLET ORAL ONCE
Status: COMPLETED | OUTPATIENT
Start: 2023-11-22 | End: 2023-11-22

## 2023-11-22 RX ORDER — LIDOCAINE HYDROCHLORIDE 10 MG/ML
1 INJECTION, SOLUTION EPIDURAL; INFILTRATION; INTRACAUDAL; PERINEURAL
Status: DISCONTINUED | OUTPATIENT
Start: 2023-11-22 | End: 2023-11-22 | Stop reason: HOSPADM

## 2023-11-22 RX ORDER — BUPIVACAINE HYDROCHLORIDE AND EPINEPHRINE 5; 5 MG/ML; UG/ML
INJECTION, SOLUTION PERINEURAL PRN
Status: DISCONTINUED | OUTPATIENT
Start: 2023-11-22 | End: 2023-11-22 | Stop reason: ALTCHOICE

## 2023-11-22 RX ORDER — PROPOFOL 10 MG/ML
INJECTION, EMULSION INTRAVENOUS CONTINUOUS PRN
Status: DISCONTINUED | OUTPATIENT
Start: 2023-11-22 | End: 2023-11-22 | Stop reason: SDUPTHER

## 2023-11-22 RX ORDER — HYDROCODONE BITARTRATE AND ACETAMINOPHEN 5; 325 MG/1; MG/1
1 TABLET ORAL
Status: COMPLETED | OUTPATIENT
Start: 2023-11-22 | End: 2023-11-22

## 2023-11-22 RX ORDER — DEXMEDETOMIDINE HYDROCHLORIDE 100 UG/ML
INJECTION, SOLUTION INTRAVENOUS PRN
Status: DISCONTINUED | OUTPATIENT
Start: 2023-11-22 | End: 2023-11-22 | Stop reason: SDUPTHER

## 2023-11-22 RX ORDER — MEPERIDINE HYDROCHLORIDE 25 MG/ML
12.5 INJECTION INTRAMUSCULAR; INTRAVENOUS; SUBCUTANEOUS EVERY 5 MIN PRN
Status: DISCONTINUED | OUTPATIENT
Start: 2023-11-22 | End: 2023-11-22 | Stop reason: HOSPADM

## 2023-11-22 RX ORDER — DIPHENHYDRAMINE HYDROCHLORIDE 50 MG/ML
12.5 INJECTION INTRAMUSCULAR; INTRAVENOUS
Status: DISCONTINUED | OUTPATIENT
Start: 2023-11-22 | End: 2023-11-22 | Stop reason: HOSPADM

## 2023-11-22 RX ORDER — FENTANYL CITRATE 50 UG/ML
INJECTION, SOLUTION INTRAMUSCULAR; INTRAVENOUS PRN
Status: DISCONTINUED | OUTPATIENT
Start: 2023-11-22 | End: 2023-11-22 | Stop reason: SDUPTHER

## 2023-11-22 RX ORDER — KETOROLAC TROMETHAMINE 30 MG/ML
30 INJECTION, SOLUTION INTRAMUSCULAR; INTRAVENOUS ONCE
Status: COMPLETED | OUTPATIENT
Start: 2023-11-22 | End: 2023-11-22

## 2023-11-22 RX ORDER — FAMOTIDINE 10 MG/ML
INJECTION, SOLUTION INTRAVENOUS PRN
Status: DISCONTINUED | OUTPATIENT
Start: 2023-11-22 | End: 2023-11-22 | Stop reason: SDUPTHER

## 2023-11-22 RX ORDER — LABETALOL HYDROCHLORIDE 5 MG/ML
10 INJECTION, SOLUTION INTRAVENOUS
Status: DISCONTINUED | OUTPATIENT
Start: 2023-11-22 | End: 2023-11-22 | Stop reason: HOSPADM

## 2023-11-22 RX ORDER — MIDAZOLAM HYDROCHLORIDE 1 MG/ML
INJECTION INTRAMUSCULAR; INTRAVENOUS PRN
Status: DISCONTINUED | OUTPATIENT
Start: 2023-11-22 | End: 2023-11-22 | Stop reason: SDUPTHER

## 2023-11-22 RX ORDER — HYDROCODONE BITARTRATE AND ACETAMINOPHEN 5; 325 MG/1; MG/1
1 TABLET ORAL EVERY 4 HOURS PRN
Qty: 15 TABLET | Refills: 0 | Status: SHIPPED | OUTPATIENT
Start: 2023-11-22 | End: 2023-11-29

## 2023-11-22 RX ADMIN — HYDROMORPHONE HYDROCHLORIDE 0.5 MG: 1 INJECTION, SOLUTION INTRAMUSCULAR; INTRAVENOUS; SUBCUTANEOUS at 09:16

## 2023-11-22 RX ADMIN — FENTANYL CITRATE 50 MCG: 50 INJECTION, SOLUTION INTRAMUSCULAR; INTRAVENOUS at 07:27

## 2023-11-22 RX ADMIN — FAMOTIDINE 20 MG: 10 INJECTION, SOLUTION INTRAVENOUS at 07:27

## 2023-11-22 RX ADMIN — KETOROLAC TROMETHAMINE 30 MG: 30 INJECTION, SOLUTION INTRAMUSCULAR at 09:42

## 2023-11-22 RX ADMIN — DEXMEDETOMIDINE 8 MCG: 100 INJECTION, SOLUTION INTRAVENOUS at 07:40

## 2023-11-22 RX ADMIN — PROPOFOL 100 MCG/KG/MIN: 10 INJECTION, EMULSION INTRAVENOUS at 07:40

## 2023-11-22 RX ADMIN — SODIUM CHLORIDE, POTASSIUM CHLORIDE, SODIUM LACTATE AND CALCIUM CHLORIDE: 600; 310; 30; 20 INJECTION, SOLUTION INTRAVENOUS at 06:22

## 2023-11-22 RX ADMIN — Medication 80 MCG: at 08:28

## 2023-11-22 RX ADMIN — FENTANYL CITRATE 50 MCG: 50 INJECTION, SOLUTION INTRAMUSCULAR; INTRAVENOUS at 08:01

## 2023-11-22 RX ADMIN — ACETAMINOPHEN 650 MG: 325 TABLET ORAL at 06:48

## 2023-11-22 RX ADMIN — DEXMEDETOMIDINE 8 MCG: 100 INJECTION, SOLUTION INTRAVENOUS at 07:27

## 2023-11-22 RX ADMIN — HYDROMORPHONE HYDROCHLORIDE 0.5 MG: 1 INJECTION, SOLUTION INTRAMUSCULAR; INTRAVENOUS; SUBCUTANEOUS at 08:52

## 2023-11-22 RX ADMIN — MIDAZOLAM HYDROCHLORIDE 2 MG: 1 INJECTION, SOLUTION INTRAMUSCULAR; INTRAVENOUS at 07:27

## 2023-11-22 RX ADMIN — SODIUM CHLORIDE, POTASSIUM CHLORIDE, SODIUM LACTATE AND CALCIUM CHLORIDE: 600; 310; 30; 20 INJECTION, SOLUTION INTRAVENOUS at 07:27

## 2023-11-22 RX ADMIN — HYDROCODONE BITARTRATE AND ACETAMINOPHEN 1 TABLET: 5; 325 TABLET ORAL at 10:13

## 2023-11-22 RX ADMIN — HYDROMORPHONE HYDROCHLORIDE 0.5 MG: 1 INJECTION, SOLUTION INTRAMUSCULAR; INTRAVENOUS; SUBCUTANEOUS at 09:02

## 2023-11-22 RX ADMIN — ONDANSETRON 4 MG: 2 INJECTION INTRAMUSCULAR; INTRAVENOUS at 07:27

## 2023-11-22 ASSESSMENT — PAIN SCALES - GENERAL
PAINLEVEL_OUTOF10: 10
PAINLEVEL_OUTOF10: 5
PAINLEVEL_OUTOF10: 9
PAINLEVEL_OUTOF10: 6
PAINLEVEL_OUTOF10: 8
PAINLEVEL_OUTOF10: 5

## 2023-11-22 ASSESSMENT — PAIN DESCRIPTION - LOCATION
LOCATION: BREAST

## 2023-11-22 ASSESSMENT — PAIN DESCRIPTION - ORIENTATION
ORIENTATION: RIGHT
ORIENTATION: RIGHT;OUTER
ORIENTATION: RIGHT

## 2023-11-22 ASSESSMENT — PAIN DESCRIPTION - DESCRIPTORS
DESCRIPTORS: BURNING
DESCRIPTORS: ACHING;BURNING
DESCRIPTORS: BURNING
DESCRIPTORS: ACHING
DESCRIPTORS: BURNING
DESCRIPTORS: BURNING

## 2023-11-22 ASSESSMENT — PAIN DESCRIPTION - ONSET: ONSET: SUDDEN

## 2023-11-22 ASSESSMENT — PAIN DESCRIPTION - PAIN TYPE
TYPE: SURGICAL PAIN
TYPE: SURGICAL PAIN

## 2023-11-22 ASSESSMENT — PAIN - FUNCTIONAL ASSESSMENT: PAIN_FUNCTIONAL_ASSESSMENT: NONE - DENIES PAIN

## 2023-11-22 NOTE — H&P
Allergen Reactions    Penicillins Rash               Review of Systems        Physical Exam  Cardiovascular:      Rate and Rhythm: Normal rate and regular rhythm. Pulmonary:      Effort: Pulmonary effort is normal.      Breath sounds: Normal breath sounds. Chest:   Breasts:     Right: No swelling, mass, skin change or tenderness. Left: No swelling, mass, skin change or tenderness. Lymphadenopathy:      Upper Body:      Right upper body: No axillary adenopathy. Left upper body: No axillary adenopathy. Neurological:      Mental Status: She is alert. Stereotactic Biopsy  PROCEDURE : LORAD STEREOTACTIC VACUUM ASSISTED BIOPSY AND RELATED DIGITAL MAMMOGRAPHY OF THE, Right BREAST. INDICATION : MICROCALCIFICATIONS. CONSENT : Following a detailed description of the LORAD stereotactic core biopsy procedure, its risks, benefits and possible alternatives (open biopsy), the patient signed the informed consent. The risks and benefits of the procedure have been explained to the patient by the stereotactic technologist and Dr. Margo Wood. IMAGING : Prebiopsy digital stereotactic imaging of the breast was obtained and confirmed the presence of the abnormality in both  and stereotactic views. The Right breast was imaged. PROJECTION : LMO Z 31.5/65mm. BIOPSY PROCEDURE : Following sterile preparation of the skin, 1% lidocaine was used as a local anesthetic. A 4mm skin incision was made for the entry site of the biopsy needle. Prefire stereotactic images were obtained to confirm accurate targeting. A 9 gauge Suros needle was used for the biopsy. 6 biopsy specimens were obtained. CLIP PLACEMENT : A clip was placed for future mammographic reference and position was confirmed with a 0 degree postfire image. SPECIMEN RADIOGRAPHY : Digital spot mammography of the core biopsy specimens demonstrated microcalcifications corresponding to the targeted calcifications.   TOLERANCE:  Pt tolerated the procedure with some discomfort. 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 CONCORDANCE:  yes  ASSESSMENT and PLAN    Diagnosis Orders   1. Calcification of right breast          2.  Abnormal mammogram  Surgical Pathology         Impr:  RIGHT DCIS  Plan: RIGHT lumpectomy with MAGSEED localization

## 2023-11-22 NOTE — ANESTHESIA POSTPROCEDURE EVALUATION
Department of Anesthesiology  Postprocedure Note    Patient: Renata Starks  MRN: [id number]  YOB: 1975  Date of evaluation: 11/22/2023      Procedure Summary     Date: 11/22/23 Room / Location: [facility] MAIN OR  / [facility] MAIN OR    Anesthesia Start: 0727 Anesthesia Stop: 1946    Procedure: RIGHT BREAST LUMPECTOMY (Right: Breast) Diagnosis:       Intraductal carcinoma in situ of right breast      (Intraductal carcinoma in situ of right breast [D05.11])    Surgeons: Judy Petty MD Responsible Provider: Gayla Perrin MD    Anesthesia Type: TIVA, MAC ASA Status: 2          Anesthesia Type: No value filed.     Cindi Phase I: Cindi Score: 9    Cindi Phase II:        Anesthesia Post Evaluation

## 2023-11-22 NOTE — OP NOTE
Operative Note  [address]  [address] [facility], [address]       Patient: Christa Villareal  YOB: 1975  MRN: [id number]    Date of Procedure: 11/22/2023    Pre-Op Diagnosis Codes:     * Intraductal carcinoma in situ of right breast [D05.11]    Post-Op Diagnosis: Same       Procedure(s):  RIGHT BREAST LUMPECTOMY    Surgeon(s):  Sancho Mathew MD    Assistant:   Surgical Assistant: Gael DUBON    Anesthesia: Monitor Anesthesia Care    Estimated Blood Loss (mL): Minimal    Complications: None    Specimens:   ID Type Source Tests Collected by Time Destination   1 : Right breast lumpectomy Tissue Breast SURGICAL PATHOLOGY Sancho Mathew MD 11/22/2023 2187    2 : posterior margin Tissue Breast SURGICAL PATHOLOGY Sancho Mathew MD 11/22/2023 8944        Implants:  * No implants in log *      Drains: * No LDAs found *    Findings: 2 magseed and 1 clip in specimen    Detailed Description of Procedure:     Pt was brought into the operating room and placed on the table in a supine position. She was sedated with IV sedation. The RIGHT breast was prepped and draped in the usual sterile fashion. O.5% marcaine was used for local anesthesia and post op pain relief. The RIGHT breast DCIS was in the lateral/LOQ. A Magseeds were identified percutaneously with the Sentimag. A 5cm horizontal incision was made and the seeds and biopsy clip and surrounding tissue were removed with sharp dissection and electrocautery. The specimen was oriented with sutures, x-rayed to confirm that the seeds and clip were in the specimen,  and sent to pathology. Hemostasis was controlled with electrocautery and surgicel powder. The breast tissue was re-approximated with multiple 3-0 vicryl sutures. The dermis was closed with interrupted 3-0 vicryl sutures and the skin was closed with running 4-0 monocryl suture. The wounds were dressed with steristrips.   The patient was

## 2023-11-22 NOTE — DISCHARGE INSTRUCTIONS
DISCHARGE INSTRUCTIONS FROM DR Ana CARO    Activity:  No driving for 24 hours. Tomorrow you may resume normal activities as tolerated. No restrictions. Wound care:  Okay to shower/bathe. Remove steristrips in one week. Swelling and bruising are normal.  Pain control: Ice pack to the breast 20 minutes/hour for the next few hours. You may use a heating pad tomorrow if needed. Take acetaminophen (Tylenol) or ibuprofen (Motrin, Advil) as needed. If pain is not controlled with over-the-counter medication call me for a narcotic prescription. Follow up:  I will call with results within one week. If you have swelling and pain next week you should make an appointment to see me in the office. Call [phone] to make the appointment. Problems/questions:  Call me on my cell phone. Dr Nadia Lemon. [phone]      DISCHARGE SUMMARY from your Nurse      PATIENT INSTRUCTIONS    After general anesthesia or intravenous sedation, for 24 hours or while taking prescription Narcotics:  Limit your activities  Do not drive and operate hazardous machinery  Do not make important personal or business decisions  Do  not drink alcoholic beverages  If you have not urinated within 8 hours after discharge, please contact your surgeon on call. Report the following to your surgeon:  Excessive pain, swelling, redness or odor of or around the surgical area  Temperature over 100.5  Nausea and vomiting lasting longer than 4 hours or if unable to take medications  Any signs of decreased circulation or nerve impairment to extremity: change in color, persistent  numbness, tingling, coldness or increase pain  Any questions      GOOD HELP TO FIGHT AN INFECTION  Here are a few tip to help reduce the chance of getting an infection after surgery:  Wash Your Hands  Good handwashing is the most important thing you and your caregiver can do. Wash before and after caring for any wounds. Dry your hand with a clean towel.   Wash with soap and water for at least 20 seconds. A TIP: sing the \"Happy Birthday\" song through one time while washing to help with the timing. Use a hand  in between washings. Shower  When your surgeon says it is OK to take a shower, use a new bar of antibacterial soap (if that is what you use, and keep that bar of soap ONLY for your use), or antibacterial body wash. Use a clean wash cloth or sponge when you bathe. Dry off with a clean towel  after every bath - be careful around any wounds, skin staples, sutures or surgical glue over/on wounds. Do not enter swimming pools, hot tubs, lakes, rivers and/or ocean until wounds are healed and your doctor/surgeon says it is OK. Use Clean Sheets  Sleep on freshly laundered sheets after your surgery. Keep the surgery site covered with a clean, dry bandage (if instructed to do so). If the bandage becomes soiled, reapply a new, dry, clean bandage. Do not allow pets to sleep with you while your wound is healing. Lifestyle Modification and Controlling Your Blood Sugar  Smoking slows wound healing. Stop smoking and limit exposure to second-hand smoke. High blood sugar slows wound healing. Eat a well-balanced diet to provide proper nutrition while healing  Monitor your blood sugar (if you are a diabetic) and take your medications as you are suppose to so you can control you blood sugar after surgery. COUGH AND DEEP BREATHE    Breathing deeply and coughing are very important exercises to do after surgery. Deep breathing and coughing open the little air tubes and air sacks in your lungs. You take deep breaths every day. You may not even notice - it is just something you do when you sigh or yawn. It is a natural exercise you do to keep these air passages open. After surgery, take deep breaths and cough, on purpose. DIRECTIONS:  Take 10 to 15 slow deep breaths every hour while awake. Breathe in deeply, and hold it for 2 seconds.   Exhale

## 2023-11-22 NOTE — ANESTHESIA PRE PROCEDURE
Evaluation  Patient summary reviewed and Nursing notes reviewed  Airway: Mallampati: II  TM distance: >3 FB   Neck ROM: full  Mouth opening: > = 3 FB   Dental:    (+) caps and implants  Comment: Multiple caps/crowns/implants    Pulmonary:Negative Pulmonary ROS and normal exam  breath sounds clear to auscultation                             Cardiovascular:Negative CV ROS  Exercise tolerance: good (>4 METS),           Rhythm: regular  Rate: normal                    Neuro/Psych:   Negative Neuro/Psych ROS              GI/Hepatic/Renal:             Endo/Other: Negative Endo/Other ROS                    Abdominal:             Vascular: negative vascular ROS. Other Findings:           Anesthesia Plan      TIVA and MAC     ASA 2       Induction: intravenous. Anesthetic plan and risks discussed with patient.         Attending anesthesiologist reviewed and agrees with Preprocedure content                Teddy Mares MD   11/22/2023

## 2023-11-27 ENCOUNTER — TELEPHONE (OUTPATIENT)
Age: 48
End: 2023-11-27

## 2023-11-27 NOTE — TELEPHONE ENCOUNTER
Date: 11/27/2023  Patient Name: Fely Hurt  YOB: 1975  Referring Provider: Donnie Contreras MD    INDICATION: Disclosure of germline genetic test results by phone. INTERVAL HISTORY: Fely Hurt is a 51 y.o. female who recently underwent germline genetic testing after being diagnosed with right breast DCIS. I spoke with her by phone today to review the results of her genetic testing and their implications. TEST RESULTS: NEGATIVE, no pathogenic variant identified  Testing performed: Multi-Cancer Panel (84 genes)  Laboratory: [facility]  A full copy of results is available in the patient's record for additional details. INTERPRETATION & DISCUSSION:  Genetic testing did not identify any genetic mutations associated with a hereditary cancer syndrome. This test cannot exclude the possibility of a mutation in this patient in a gene which was not included in her analysis. The patient may check in with our clinic and/or her other healthcare providers periodically to determine if there have been any updates to our knowledge on genes associated with inherited cancer predisposition syndromes. The treatment and management plan for the patient's current diagnosis should not be changed based on the negative test result. The patient's risk for additional cancers is not expected to be increased above the general population risk based on her genetic testing results. An individual's cancer risk and medical management are not determined by genetic test results alone. The patient should continue to follow the cancer screening guidelines outlined by her physicians based on personal and family history. Genetic testing is not currently indicated for relatives based on the patient's genetic results. Her test results cannot exclude mutations in cancer susceptibility genes in other family members and other family members may independently meet testing criteria.  First degree female relatives of an individual

## 2023-11-28 ENCOUNTER — OFFICE VISIT (OUTPATIENT)
Age: 48
End: 2023-11-28

## 2023-11-28 VITALS — HEIGHT: 65 IN | BODY MASS INDEX: 33.32 KG/M2 | WEIGHT: 200 LBS

## 2023-11-28 DIAGNOSIS — N64.89 SEROMA OF BREAST: Primary | ICD-10-CM

## 2023-11-28 DIAGNOSIS — D05.11 BREAST NEOPLASM, TIS (DCIS), RIGHT: ICD-10-CM

## 2023-11-28 PROCEDURE — 99024 POSTOP FOLLOW-UP VISIT: CPT | Performed by: SURGERY

## 2023-11-28 NOTE — PROGRESS NOTES
HISTORY OF PRESENT ILLNESS  Lara Ronquillo is a 51 y.o. female     HPI ESTABLISHED Patient POD # 6 RIGHT breast lumpectomy with swelling and pain. which started 3 days after surgery  The skin is very sensitive. Here with her     10/2023 RIGHT DCIS, grade 1, ER 90%, WA 50%  BRCA neg ([facility])    Review of Systems      Physical Exam  Chest:   Breasts:     Right: Swelling, skin change and tenderness present. No bleeding. ASPIRATION OF SEROMA  Indication : Seroma:  right  upper outer quadrant  Prep : Alcohol. Guidance : Ultrasound guidance. Yield :  250cc of thick brown fluid was aspirated with an 18 gauge needle. Effect : Seroma completely aspirated. Tolerance: Pt tolerated the procedure with no discomfort  Disposition:  Follow up in one week if swelling recurs     ASSESSMENT and PLAN   Diagnosis Orders   1.  Seroma of breast          Seroma aspirated  Follow up 1 week  Path pending

## 2023-11-29 ENCOUNTER — TELEPHONE (OUTPATIENT)
Age: 48
End: 2023-11-29

## 2023-11-29 ENCOUNTER — CLINICAL DOCUMENTATION (OUTPATIENT)
Age: 48
End: 2023-11-29

## 2023-11-29 DIAGNOSIS — Z09 SURGERY FOLLOW-UP: Primary | ICD-10-CM

## 2023-11-29 RX ORDER — OXYCODONE HYDROCHLORIDE AND ACETAMINOPHEN 5; 325 MG/1; MG/1
1 TABLET ORAL EVERY 6 HOURS PRN
Qty: 15 TABLET | Refills: 0 | Status: SHIPPED | OUTPATIENT
Start: 2023-11-29 | End: 2023-12-04

## 2023-11-29 NOTE — PROGRESS NOTES
Patient called asking for [facility] forms to be updated due to denial. I called and spoke with her to clarify changes and a new plan for forms. I have changed the information and re faxed it to [address] [place] [address] [address] after speaking with them and getting the Okay to send the same case number.

## 2023-12-01 ENCOUNTER — TELEPHONE (OUTPATIENT)
Age: 48
End: 2023-12-01

## 2023-12-01 DIAGNOSIS — D05.11 BREAST NEOPLASM, TIS (DCIS), RIGHT: Primary | ICD-10-CM

## 2023-12-05 ENCOUNTER — OFFICE VISIT (OUTPATIENT)
Age: 48
End: 2023-12-05

## 2023-12-05 VITALS — WEIGHT: 200 LBS | HEIGHT: 65 IN | BODY MASS INDEX: 33.32 KG/M2

## 2023-12-05 DIAGNOSIS — N64.89 SEROMA OF BREAST: Primary | ICD-10-CM

## 2023-12-05 PROCEDURE — 99024 POSTOP FOLLOW-UP VISIT: CPT | Performed by: SURGERY

## 2023-12-05 NOTE — PROGRESS NOTES
HISTORY OF PRESENT ILLNESS  Tanvi Mullen is a 51 y.o. female     HPI ESTABLISHED Patient 3 weeks s/p RIGHT lumpectomy for DCIS, her with a seroma. Feels like it is getting smaller. 11/28/23- 250cc thick brown fluid was aspirated with an 18 gauge needle. 12/2/23- about 150cc thick brown fluid was aspirated with an 18 gauge needle. 11/2023 RIGHT lumpectomy 9mm DCIS, grade 1, ER 90%, OR 50%  BRCA neg ([facility])       Review of Systems      Physical Exam  Chest:   Breasts:     Right: Swelling present. No skin change (incsion healed) or tenderness. ASPIRATION OF SEROMA  Indication : Seroma:  right  lower outer quadrant  Prep : Alcohol. Guidance : Ultrasound guidance. Yield :  65cc of serous fluid was aspirated with an 18 gauge needle. Effect : Seroma completely aspirated. Tolerance: Pt tolerated the procedure with no discomfort  Disposition:  Follow up in one week if swelling recurs   ASSESSMENT and PLAN   Diagnosis Orders   1.  Seroma of breast          Seroma aspirated

## 2023-12-06 ENCOUNTER — TELEPHONE (OUTPATIENT)
Age: 48
End: 2023-12-06

## 2023-12-06 NOTE — TELEPHONE ENCOUNTER
Your radiation oncologist appointment with Dr Zapata  12/14/2023 @ 1:00 pm    Please arrive 15 minutes early    The address is [address], [place] [address]    [phone]          Please bring your ID, insurance card and co-pay if needed.    Please call their office if you need to cancel and reschedule.

## 2023-12-14 ENCOUNTER — HOSPITAL ENCOUNTER (OUTPATIENT)
Facility: HOSPITAL | Age: 48
End: 2023-12-14

## 2023-12-14 VITALS
SYSTOLIC BLOOD PRESSURE: 149 MMHG | RESPIRATION RATE: 16 BRPM | HEART RATE: 98 BPM | WEIGHT: 206 LBS | BODY MASS INDEX: 35.17 KG/M2 | HEIGHT: 64 IN | OXYGEN SATURATION: 97 % | DIASTOLIC BLOOD PRESSURE: 88 MMHG

## 2023-12-14 DIAGNOSIS — N95.1 HOT FLUSHES, PERIMENOPAUSAL: ICD-10-CM

## 2023-12-14 DIAGNOSIS — D05.11 BREAST NEOPLASM, TIS (DCIS), RIGHT: Primary | ICD-10-CM

## 2023-12-14 NOTE — PROGRESS NOTES
[facility] Work Encounter    Reason for Assessment:      [x] Initial Assessment [] Social Work Referral [] Social Work Follow-up []Initiated   [] Other:    Sources of Information:    [x]Patient  []Family  [employer]  []Medical Record    Mental Status:    [x]Alert  []Lethargic  []Unresponsive   [] Unable to assess   Oriented to:  []Person  []Place  []Time  []Situation      Relationship Status:  []Single  [x]  []Significant Other/Life Partner  []  []  []    Living Circumstances:  []Lives Alone  [x]Family/Significant Other in Household  []Roommates  []Children in the Home  []Paid Caregivers  []Assisted Living Facility/Group [facility]  []Homeless  []Incarcerated  []Environmental/Care Concerns  []Other:    Employment Status:  []Employed Full-time []Employed Part-time []Homemaker  [] Retired [] Short-Term Disability [] Long-Term Disability  [] Unemployed   [] [id number] [facility]   [] [facility]  [] Self-Employment    Barriers to Learning:    []Language  []Developmental  []Cognitive  []Altered Mental Status  []Visual/Hearing Impairment  []Unable to Read/Write  []Motivational  [] Challenges Understanding Medical Jargon [x]No Barriers Identified      Financial/Legal Concerns:    []Uninsured  []Limited Income/Resources  []Non-Citizen  []Food Insecurity [x]No Concerns Identified   []Other:    Restorationism/Spiritual/Existential:  Does patient have any spiritual or Church beliefs? [] Yes [] No  Is the patient involved in a spiritual, miesha or Church community?  [] Yes [] No  Patient expressing spiritual/existential angst? [] Yes [] No  Notes:    Support System:    Identified Support Person/Group:  [x]Strong  []Fair  []Limited    Coping with Illness:   []  Coping Well  [] Challenges Coping with Serious Illness [] Situational Depression [x] Situational Anxiety [] Anticipatory Grief  [] Recent Loss [] Caregiver Ferguson            Narrative: Met with patient to introduce
impaired cranial nerve(s), uncoordinated gait, motor impairment, a sensory deficit and impaired reflexes. Hematologic/lymphatic: No cervical, supraclavicular, or axillary adenopathy       IMAGING:   Imaging was personally reviewed as detailed in the history (see above). PATHOLOGY:   Pathology was personally reviewed as detailed in the HPI. LABS:   All labs were personally reviewed      TIME and COUNSELIN minutes were spent with the patient,  acquiring the vital information vital to the case. All outside records including surgical oncology and medical oncology notes, lab work/pathology, and imaging were personally reviewed. I agree with the outside radiology impressions. The patient was examined to verify findings as related in the HPI, as well as other areas as needed. Time was allowed for all questions about the proposed course of care to be answered. Greater than 50% time was spent face to face with the patient in counseling and coordination of care.          Christiano Law MD  [facility]  [address]  P: [address] [place] [address]  P: [address] [place] [address] [phone] [facility], [address]  P: [phone]

## 2023-12-14 NOTE — ADDENDUM NOTE
Encounter addended by: Shanita Pollack LCSW on: 12/14/2023 3:09 PM   Actions taken: Clinical Note Signed

## 2023-12-26 ENCOUNTER — HOSPITAL ENCOUNTER (OUTPATIENT)
Facility: HOSPITAL | Age: 48
Discharge: HOME | End: 2023-12-29
Attending: RADIOLOGY
Payer: COMMERCIAL

## 2023-12-26 DIAGNOSIS — D05.11 BREAST NEOPLASM, TIS (DCIS), RIGHT: ICD-10-CM

## 2023-12-26 DIAGNOSIS — N95.1 HOT FLUSHES, PERIMENOPAUSAL: ICD-10-CM

## 2023-12-26 LAB
HCG UR QL: NEGATIVE
TSH SERPL DL<=0.05 MIU/L-ACNC: 1.22 UIU/ML (ref 0.36–3.74)

## 2023-12-26 PROCEDURE — 36415 COLL VENOUS BLD VENIPUNCTURE: CPT

## 2023-12-26 PROCEDURE — 84443 ASSAY THYROID STIM HORMONE: CPT

## 2023-12-26 PROCEDURE — 81025 URINE PREGNANCY TEST: CPT

## 2024-01-15 ENCOUNTER — HOSPITAL ENCOUNTER (OUTPATIENT)
Facility: HOSPITAL | Age: 49
Discharge: HOME | End: 2024-01-18
Attending: RADIOLOGY

## 2024-01-15 DIAGNOSIS — D05.11 BREAST NEOPLASM, TIS (DCIS), RIGHT: Primary | ICD-10-CM

## 2024-01-15 LAB
RAD ONC ARIA COURSE FIRST TREATMENT DATE: NORMAL
RAD ONC ARIA COURSE ID: NORMAL
RAD ONC ARIA COURSE INTENT: NORMAL
RAD ONC ARIA COURSE LAST TREATMENT DATE: NORMAL
RAD ONC ARIA COURSE SESSION NUMBER: 1
RAD ONC ARIA COURSE START DATE: NORMAL
RAD ONC ARIA COURSE TREATMENT ELAPSED DAYS: 0
RAD ONC ARIA PLAN FRACTIONS TREATED TO DATE: 1
RAD ONC ARIA PLAN ID: NORMAL
RAD ONC ARIA PLAN PRESCRIBED DOSE PER FRACTION: 2.67 GY
RAD ONC ARIA PLAN PRIMARY REFERENCE POINT: NORMAL
RAD ONC ARIA PLAN TOTAL FRACTIONS PRESCRIBED: 16
RAD ONC ARIA PLAN TOTAL PRESCRIBED DOSE: 4272 CGY
RAD ONC ARIA REFERENCE POINT DOSAGE GIVEN TO DATE: 2.67 GY
RAD ONC ARIA REFERENCE POINT DOSAGE GIVEN TO DATE: 2.72 GY
RAD ONC ARIA REFERENCE POINT ID: NORMAL
RAD ONC ARIA REFERENCE POINT ID: NORMAL
RAD ONC ARIA REFERENCE POINT SESSION DOSAGE GIVEN: 2.67 GY
RAD ONC ARIA REFERENCE POINT SESSION DOSAGE GIVEN: 2.72 GY

## 2024-01-15 ASSESSMENT — PATIENT HEALTH QUESTIONNAIRE - PHQ9
SUM OF ALL RESPONSES TO PHQ9 QUESTIONS 1 & 2: 0
SUM OF ALL RESPONSES TO PHQ QUESTIONS 1-9: 0
1. LITTLE INTEREST OR PLEASURE IN DOING THINGS: 0
SUM OF ALL RESPONSES TO PHQ QUESTIONS 1-9: 0
2. FEELING DOWN, DEPRESSED OR HOPELESS: 0

## 2024-01-15 ASSESSMENT — PAIN SCALES - GENERAL: PAINLEVEL_OUTOF10: 0

## 2024-01-15 NOTE — PROGRESS NOTES
Cancer [place] at [facility]  [facility]      RADIATION ONCOLOGY WEEKLY PROGRESS NOTE    Encounter Date: 01/15/24   Patient Name: Roz Amin  YOB: 1975  Medical Record Number: [id number]    DIAGNOSIS:       ICD-10-CM    1. Breast neoplasm, Tis (DCIS), right  D05.11         STAGING:   Cancer Staging   Breast neoplasm, Tis (DCIS), right  Staging form: Breast, AJCC 8th Edition  - Clinical stage from 10/17/2023: Stage 0 (cTis (DCIS), cN0, cM0, ER+, RI+, HER2: Not Assessed) - Signed by Cecelia Jones MD on 10/17/2023  - Pathologic stage from 12/1/2023: Stage 0 (pTis (DCIS), pN0, cM0, ER+, RI: Not Assessed, HER2: Not Assessed) - Signed by Cecelia Jones MD on 12/1/2023  AJCC Staging has been reviewed    ASSESSMENT:   47 yo F with RIGHT breast DCIS ER+/RI+ status post lumpectomy (pTisNx, grade 1, neg margin)      TREATMENT DETAILS:     Treatment Site Dose/Fx (cGy) #Fx Current Dose (cGy) Total Planned Dose (cGy) Start Date End Date   Right breast 267 1/16 267 4272 1/15/24 01/15/24   Right lumpectomy bed boost 250   1000       No concurrent systemic therapy    SUBJECTIVE   Patient seen today for their weekly on treatment evaluation. At fraction 1 tolerated well, no issues. Went over RT schedule and answered questions.      OBJECTIVE/PHYSICAL EXAM:   VITAL SIGNS: LMP 10/02/2022   Wt Readings from Last 5 Encounters:   12/14/23 93.4 kg (206 lb)   12/05/23 90.7 kg (200 lb)   11/28/23 90.7 kg (200 lb)   11/22/23 91 kg (200 lb 9.9 oz)   10/27/23 88 kg (194 lb)   Pain Level: 0      Performance status:  ECOG 0, fully active, able to carry on all predisease activities without restrictions  General: Alert and conversant, in NAD  Skin: no erythema    LABS:  Personally reviewed    MEDICATIONS:    Current Outpatient Medications:     NONFORMULARY, Take 100 mg by mouth every evening Hemp Gummies, Disp: , Rfl:     Semaglutide,0.25 or 0.5MG/DOS, (OZEMPIC, 0.25 OR

## 2024-01-16 ENCOUNTER — HOSPITAL ENCOUNTER (OUTPATIENT)
Facility: HOSPITAL | Age: 49
Discharge: HOME | End: 2024-01-19
Attending: RADIOLOGY

## 2024-01-16 LAB
RAD ONC ARIA COURSE FIRST TREATMENT DATE: NORMAL
RAD ONC ARIA COURSE ID: NORMAL
RAD ONC ARIA COURSE INTENT: NORMAL
RAD ONC ARIA COURSE LAST TREATMENT DATE: NORMAL
RAD ONC ARIA COURSE SESSION NUMBER: 2
RAD ONC ARIA COURSE START DATE: NORMAL
RAD ONC ARIA COURSE TREATMENT ELAPSED DAYS: 1
RAD ONC ARIA PLAN FRACTIONS TREATED TO DATE: 2
RAD ONC ARIA PLAN ID: NORMAL
RAD ONC ARIA PLAN PRESCRIBED DOSE PER FRACTION: 2.67 GY
RAD ONC ARIA PLAN PRIMARY REFERENCE POINT: NORMAL
RAD ONC ARIA PLAN TOTAL FRACTIONS PRESCRIBED: 16
RAD ONC ARIA PLAN TOTAL PRESCRIBED DOSE: 4272 CGY
RAD ONC ARIA REFERENCE POINT DOSAGE GIVEN TO DATE: 5.34 GY
RAD ONC ARIA REFERENCE POINT DOSAGE GIVEN TO DATE: 5.45 GY
RAD ONC ARIA REFERENCE POINT ID: NORMAL
RAD ONC ARIA REFERENCE POINT ID: NORMAL
RAD ONC ARIA REFERENCE POINT SESSION DOSAGE GIVEN: 2.67 GY
RAD ONC ARIA REFERENCE POINT SESSION DOSAGE GIVEN: 2.72 GY

## 2024-01-17 ENCOUNTER — HOSPITAL ENCOUNTER (OUTPATIENT)
Facility: HOSPITAL | Age: 49
Discharge: HOME | End: 2024-01-20
Attending: RADIOLOGY

## 2024-01-17 LAB
RAD ONC ARIA COURSE FIRST TREATMENT DATE: NORMAL
RAD ONC ARIA COURSE ID: NORMAL
RAD ONC ARIA COURSE INTENT: NORMAL
RAD ONC ARIA COURSE LAST TREATMENT DATE: NORMAL
RAD ONC ARIA COURSE SESSION NUMBER: 3
RAD ONC ARIA COURSE START DATE: NORMAL
RAD ONC ARIA COURSE TREATMENT ELAPSED DAYS: 2
RAD ONC ARIA PLAN FRACTIONS TREATED TO DATE: 3
RAD ONC ARIA PLAN ID: NORMAL
RAD ONC ARIA PLAN PRESCRIBED DOSE PER FRACTION: 2.67 GY
RAD ONC ARIA PLAN PRIMARY REFERENCE POINT: NORMAL
RAD ONC ARIA PLAN TOTAL FRACTIONS PRESCRIBED: 16
RAD ONC ARIA PLAN TOTAL PRESCRIBED DOSE: 4272 CGY
RAD ONC ARIA REFERENCE POINT DOSAGE GIVEN TO DATE: 8.01 GY
RAD ONC ARIA REFERENCE POINT DOSAGE GIVEN TO DATE: 8.17 GY
RAD ONC ARIA REFERENCE POINT ID: NORMAL
RAD ONC ARIA REFERENCE POINT ID: NORMAL
RAD ONC ARIA REFERENCE POINT SESSION DOSAGE GIVEN: 2.67 GY
RAD ONC ARIA REFERENCE POINT SESSION DOSAGE GIVEN: 2.72 GY

## 2024-01-18 ENCOUNTER — HOSPITAL ENCOUNTER (OUTPATIENT)
Facility: HOSPITAL | Age: 49
Discharge: HOME | End: 2024-01-21
Attending: RADIOLOGY

## 2024-01-18 LAB
RAD ONC ARIA COURSE FIRST TREATMENT DATE: NORMAL
RAD ONC ARIA COURSE ID: NORMAL
RAD ONC ARIA COURSE INTENT: NORMAL
RAD ONC ARIA COURSE LAST TREATMENT DATE: NORMAL
RAD ONC ARIA COURSE SESSION NUMBER: 4
RAD ONC ARIA COURSE START DATE: NORMAL
RAD ONC ARIA COURSE TREATMENT ELAPSED DAYS: 3
RAD ONC ARIA PLAN FRACTIONS TREATED TO DATE: 4
RAD ONC ARIA PLAN ID: NORMAL
RAD ONC ARIA PLAN PRESCRIBED DOSE PER FRACTION: 2.67 GY
RAD ONC ARIA PLAN PRIMARY REFERENCE POINT: NORMAL
RAD ONC ARIA PLAN TOTAL FRACTIONS PRESCRIBED: 16
RAD ONC ARIA PLAN TOTAL PRESCRIBED DOSE: 4272 CGY
RAD ONC ARIA REFERENCE POINT DOSAGE GIVEN TO DATE: 10.68 GY
RAD ONC ARIA REFERENCE POINT DOSAGE GIVEN TO DATE: 10.9 GY
RAD ONC ARIA REFERENCE POINT ID: NORMAL
RAD ONC ARIA REFERENCE POINT ID: NORMAL
RAD ONC ARIA REFERENCE POINT SESSION DOSAGE GIVEN: 2.67 GY
RAD ONC ARIA REFERENCE POINT SESSION DOSAGE GIVEN: 2.72 GY

## 2024-01-19 ENCOUNTER — HOSPITAL ENCOUNTER (OUTPATIENT)
Facility: HOSPITAL | Age: 49
Discharge: HOME | End: 2024-01-22
Attending: RADIOLOGY

## 2024-01-19 LAB
RAD ONC ARIA COURSE FIRST TREATMENT DATE: NORMAL
RAD ONC ARIA COURSE ID: NORMAL
RAD ONC ARIA COURSE INTENT: NORMAL
RAD ONC ARIA COURSE LAST TREATMENT DATE: NORMAL
RAD ONC ARIA COURSE SESSION NUMBER: 5
RAD ONC ARIA COURSE START DATE: NORMAL
RAD ONC ARIA COURSE TREATMENT ELAPSED DAYS: 4
RAD ONC ARIA PLAN FRACTIONS TREATED TO DATE: 5
RAD ONC ARIA PLAN ID: NORMAL
RAD ONC ARIA PLAN PRESCRIBED DOSE PER FRACTION: 2.67 GY
RAD ONC ARIA PLAN PRIMARY REFERENCE POINT: NORMAL
RAD ONC ARIA PLAN TOTAL FRACTIONS PRESCRIBED: 16
RAD ONC ARIA PLAN TOTAL PRESCRIBED DOSE: 4272 CGY
RAD ONC ARIA REFERENCE POINT DOSAGE GIVEN TO DATE: 13.35 GY
RAD ONC ARIA REFERENCE POINT DOSAGE GIVEN TO DATE: 13.62 GY
RAD ONC ARIA REFERENCE POINT ID: NORMAL
RAD ONC ARIA REFERENCE POINT ID: NORMAL
RAD ONC ARIA REFERENCE POINT SESSION DOSAGE GIVEN: 2.67 GY
RAD ONC ARIA REFERENCE POINT SESSION DOSAGE GIVEN: 2.72 GY

## 2024-01-22 ENCOUNTER — HOSPITAL ENCOUNTER (OUTPATIENT)
Facility: HOSPITAL | Age: 49
Discharge: HOME | End: 2024-01-25
Attending: RADIOLOGY

## 2024-01-22 DIAGNOSIS — D05.11 BREAST NEOPLASM, TIS (DCIS), RIGHT: Primary | ICD-10-CM

## 2024-01-22 LAB
RAD ONC ARIA COURSE FIRST TREATMENT DATE: NORMAL
RAD ONC ARIA COURSE ID: NORMAL
RAD ONC ARIA COURSE INTENT: NORMAL
RAD ONC ARIA COURSE LAST TREATMENT DATE: NORMAL
RAD ONC ARIA COURSE SESSION NUMBER: 6
RAD ONC ARIA COURSE START DATE: NORMAL
RAD ONC ARIA COURSE TREATMENT ELAPSED DAYS: 7
RAD ONC ARIA PLAN FRACTIONS TREATED TO DATE: 6
RAD ONC ARIA PLAN ID: NORMAL
RAD ONC ARIA PLAN PRESCRIBED DOSE PER FRACTION: 2.67 GY
RAD ONC ARIA PLAN PRIMARY REFERENCE POINT: NORMAL
RAD ONC ARIA PLAN TOTAL FRACTIONS PRESCRIBED: 16
RAD ONC ARIA PLAN TOTAL PRESCRIBED DOSE: 4272 CGY
RAD ONC ARIA REFERENCE POINT DOSAGE GIVEN TO DATE: 16.02 GY
RAD ONC ARIA REFERENCE POINT DOSAGE GIVEN TO DATE: 16.34 GY
RAD ONC ARIA REFERENCE POINT ID: NORMAL
RAD ONC ARIA REFERENCE POINT ID: NORMAL
RAD ONC ARIA REFERENCE POINT SESSION DOSAGE GIVEN: 2.67 GY
RAD ONC ARIA REFERENCE POINT SESSION DOSAGE GIVEN: 2.72 GY

## 2024-01-22 ASSESSMENT — PAIN SCALES - GENERAL: PAINLEVEL_OUTOF10: 0

## 2024-01-22 NOTE — PROGRESS NOTES
Cancer [place] at   [facility]      RADIATION ONCOLOGY WEEKLY PROGRESS NOTE    Encounter Date: 01/22/24   Patient Name: Roz Amin  YOB: 1975  Medical Record Number: [id number]    DIAGNOSIS:       ICD-10-CM    1. Breast neoplasm, Tis (DCIS), right  D05.11         STAGING:    Cancer Staging   Breast neoplasm, Tis (DCIS), right  Staging form: Breast, AJCC 8th Edition  - Clinical stage from 10/17/2023: Stage 0 (cTis (DCIS), cN0, cM0, ER+, MT+, HER2: Not Assessed) - Signed by Cecelia Jones MD on 10/17/2023  - Pathologic stage from 12/1/2023: Stage 0 (pTis (DCIS), pN0, cM0, ER+, MT: Not Assessed, HER2: Not Assessed) - Signed by Cecelia Jones MD on 12/1/2023  AJCC Staging has been reviewed    ASSESSMENT:   47 yo F with RIGHT breast DCIS ER+/MT+ status post lumpectomy (pTisNx, grade 1, neg margin)      TREATMENT DETAILS:     Treatment Site Dose/Fx (cGy) #Fx Current Dose (cGy) Total Planned Dose (cGy) Start Date End Date   Right breast 267 6/16 1602 4272 1/15/24 01/22/24   Right lumpectomy bed boost 250   1000       No concurrent systemic therapy    SUBJECTIVE   1/15/24: Patient seen today for their weekly on treatment evaluation. At fraction 1 tolerated well, no issues. Went over RT schedule and answered questions.    1/22/24: at fraction 6 doing well. Mild fatigue. Minimal skin erythema; no discomfort.      OBJECTIVE/PHYSICAL EXAM:   VITAL SIGNS: LMP 10/02/2022   Wt Readings from Last 5 Encounters:   12/14/23 93.4 kg (206 lb)   12/05/23 90.7 kg (200 lb)   11/28/23 90.7 kg (200 lb)   11/22/23 91 kg (200 lb 9.9 oz)   10/27/23 88 kg (194 lb)   Pain Level: 0      Performance status:  ECOG 0, fully active, able to carry on all predisease activities without restrictions  General: Alert and conversant, in NAD  Skin: minimal right breast erythema    LABS:  Personally reviewed    MEDICATIONS:    Current Outpatient Medications:

## 2024-01-24 ENCOUNTER — HOSPITAL ENCOUNTER (OUTPATIENT)
Facility: HOSPITAL | Age: 49
Discharge: HOME | End: 2024-01-27
Attending: RADIOLOGY

## 2024-01-24 LAB
RAD ONC ARIA COURSE FIRST TREATMENT DATE: NORMAL
RAD ONC ARIA COURSE ID: NORMAL
RAD ONC ARIA COURSE INTENT: NORMAL
RAD ONC ARIA COURSE LAST TREATMENT DATE: NORMAL
RAD ONC ARIA COURSE SESSION NUMBER: 7
RAD ONC ARIA COURSE START DATE: NORMAL
RAD ONC ARIA COURSE TREATMENT ELAPSED DAYS: 9
RAD ONC ARIA PLAN FRACTIONS TREATED TO DATE: 7
RAD ONC ARIA PLAN ID: NORMAL
RAD ONC ARIA PLAN PRESCRIBED DOSE PER FRACTION: 2.67 GY
RAD ONC ARIA PLAN PRIMARY REFERENCE POINT: NORMAL
RAD ONC ARIA PLAN TOTAL FRACTIONS PRESCRIBED: 16
RAD ONC ARIA PLAN TOTAL PRESCRIBED DOSE: 4272 CGY
RAD ONC ARIA REFERENCE POINT DOSAGE GIVEN TO DATE: 18.69 GY
RAD ONC ARIA REFERENCE POINT DOSAGE GIVEN TO DATE: 19.07 GY
RAD ONC ARIA REFERENCE POINT ID: NORMAL
RAD ONC ARIA REFERENCE POINT ID: NORMAL
RAD ONC ARIA REFERENCE POINT SESSION DOSAGE GIVEN: 2.67 GY
RAD ONC ARIA REFERENCE POINT SESSION DOSAGE GIVEN: 2.72 GY

## 2024-01-25 ENCOUNTER — HOSPITAL ENCOUNTER (OUTPATIENT)
Facility: HOSPITAL | Age: 49
Discharge: HOME | End: 2024-01-28
Attending: RADIOLOGY

## 2024-01-25 LAB
RAD ONC ARIA COURSE FIRST TREATMENT DATE: NORMAL
RAD ONC ARIA COURSE ID: NORMAL
RAD ONC ARIA COURSE INTENT: NORMAL
RAD ONC ARIA COURSE LAST TREATMENT DATE: NORMAL
RAD ONC ARIA COURSE SESSION NUMBER: 8
RAD ONC ARIA COURSE START DATE: NORMAL
RAD ONC ARIA COURSE TREATMENT ELAPSED DAYS: 10
RAD ONC ARIA PLAN FRACTIONS TREATED TO DATE: 8
RAD ONC ARIA PLAN ID: NORMAL
RAD ONC ARIA PLAN PRESCRIBED DOSE PER FRACTION: 2.67 GY
RAD ONC ARIA PLAN PRIMARY REFERENCE POINT: NORMAL
RAD ONC ARIA PLAN TOTAL FRACTIONS PRESCRIBED: 16
RAD ONC ARIA PLAN TOTAL PRESCRIBED DOSE: 4272 CGY
RAD ONC ARIA REFERENCE POINT DOSAGE GIVEN TO DATE: 21.36 GY
RAD ONC ARIA REFERENCE POINT DOSAGE GIVEN TO DATE: 21.79 GY
RAD ONC ARIA REFERENCE POINT ID: NORMAL
RAD ONC ARIA REFERENCE POINT ID: NORMAL
RAD ONC ARIA REFERENCE POINT SESSION DOSAGE GIVEN: 2.67 GY
RAD ONC ARIA REFERENCE POINT SESSION DOSAGE GIVEN: 2.72 GY

## 2024-01-26 ENCOUNTER — HOSPITAL ENCOUNTER (OUTPATIENT)
Facility: HOSPITAL | Age: 49
Discharge: HOME | End: 2024-01-29
Attending: RADIOLOGY

## 2024-01-26 LAB
RAD ONC ARIA COURSE FIRST TREATMENT DATE: NORMAL
RAD ONC ARIA COURSE ID: NORMAL
RAD ONC ARIA COURSE INTENT: NORMAL
RAD ONC ARIA COURSE LAST TREATMENT DATE: NORMAL
RAD ONC ARIA COURSE SESSION NUMBER: 9
RAD ONC ARIA COURSE START DATE: NORMAL
RAD ONC ARIA COURSE TREATMENT ELAPSED DAYS: 11
RAD ONC ARIA PLAN FRACTIONS TREATED TO DATE: 9
RAD ONC ARIA PLAN ID: NORMAL
RAD ONC ARIA PLAN PRESCRIBED DOSE PER FRACTION: 2.67 GY
RAD ONC ARIA PLAN PRIMARY REFERENCE POINT: NORMAL
RAD ONC ARIA PLAN TOTAL FRACTIONS PRESCRIBED: 16
RAD ONC ARIA PLAN TOTAL PRESCRIBED DOSE: 4272 CGY
RAD ONC ARIA REFERENCE POINT DOSAGE GIVEN TO DATE: 24.03 GY
RAD ONC ARIA REFERENCE POINT DOSAGE GIVEN TO DATE: 24.52 GY
RAD ONC ARIA REFERENCE POINT ID: NORMAL
RAD ONC ARIA REFERENCE POINT ID: NORMAL
RAD ONC ARIA REFERENCE POINT SESSION DOSAGE GIVEN: 2.67 GY
RAD ONC ARIA REFERENCE POINT SESSION DOSAGE GIVEN: 2.72 GY

## 2024-01-29 ENCOUNTER — HOSPITAL ENCOUNTER (OUTPATIENT)
Facility: HOSPITAL | Age: 49
Discharge: HOME | End: 2024-02-01
Attending: RADIOLOGY

## 2024-01-29 DIAGNOSIS — D05.11 BREAST NEOPLASM, TIS (DCIS), RIGHT: Primary | ICD-10-CM

## 2024-01-29 LAB
RAD ONC ARIA COURSE FIRST TREATMENT DATE: NORMAL
RAD ONC ARIA COURSE ID: NORMAL
RAD ONC ARIA COURSE INTENT: NORMAL
RAD ONC ARIA COURSE LAST TREATMENT DATE: NORMAL
RAD ONC ARIA COURSE SESSION NUMBER: 10
RAD ONC ARIA COURSE START DATE: NORMAL
RAD ONC ARIA COURSE TREATMENT ELAPSED DAYS: 14
RAD ONC ARIA PLAN FRACTIONS TREATED TO DATE: 10
RAD ONC ARIA PLAN ID: NORMAL
RAD ONC ARIA PLAN PRESCRIBED DOSE PER FRACTION: 2.67 GY
RAD ONC ARIA PLAN PRIMARY REFERENCE POINT: NORMAL
RAD ONC ARIA PLAN TOTAL FRACTIONS PRESCRIBED: 16
RAD ONC ARIA PLAN TOTAL PRESCRIBED DOSE: 4272 CGY
RAD ONC ARIA REFERENCE POINT DOSAGE GIVEN TO DATE: 26.7 GY
RAD ONC ARIA REFERENCE POINT DOSAGE GIVEN TO DATE: 27.24 GY
RAD ONC ARIA REFERENCE POINT ID: NORMAL
RAD ONC ARIA REFERENCE POINT ID: NORMAL
RAD ONC ARIA REFERENCE POINT SESSION DOSAGE GIVEN: 2.67 GY
RAD ONC ARIA REFERENCE POINT SESSION DOSAGE GIVEN: 2.72 GY

## 2024-01-29 ASSESSMENT — PAIN SCALES - GENERAL: PAINLEVEL_OUTOF10: 0

## 2024-01-29 NOTE — PROGRESS NOTES
Cancer [place] at [facility]  [facility]      RADIATION ONCOLOGY WEEKLY PROGRESS NOTE    Encounter Date: 01/29/24   Patient Name: Roz Amin  YOB: 1975  Medical Record Number: [id number]    DIAGNOSIS:       ICD-10-CM    1. Breast neoplasm, Tis (DCIS), right  D05.11         STAGING:    Cancer Staging   Breast neoplasm, Tis (DCIS), right  Staging form: Breast, AJCC 8th Edition  - Clinical stage from 10/17/2023: Stage 0 (cTis (DCIS), cN0, cM0, ER+, AK+, HER2: Not Assessed) - Signed by Cecelia Jones MD on 10/17/2023  - Pathologic stage from 12/1/2023: Stage 0 (pTis (DCIS), pN0, cM0, ER+, AK: Not Assessed, HER2: Not Assessed) - Signed by Cecelia Jones MD on 12/1/2023  AJCC Staging has been reviewed    ASSESSMENT:   47 yo F with RIGHT breast DCIS ER+/AK+ status post lumpectomy (pTisNx, grade 1, neg margin)      TREATMENT DETAILS:     Treatment Site Dose/Fx (cGy) #Fx Current Dose (cGy) Total Planned Dose (cGy) Start Date End Date   Right breast 267 10/16 2670 2562 1/15/24 01/29/24   Right lumpectomy bed boost 250   1000       No concurrent systemic therapy    SUBJECTIVE   1/15/24: Patient seen today for their weekly on treatment evaluation. At fraction 1 tolerated well, no issues. Went over RT schedule and answered questions.    1/22/24: at fraction 6 doing well. Mild fatigue. Minimal skin erythema; no discomfort.    1/29/24: at fraction 10 with mild erythema over right breast, no desquamation. Using moisturizer daily. No pruritus. Minimal discomfort. Slightly increased fatigue.      OBJECTIVE/PHYSICAL EXAM:   VITAL SIGNS: LMP 10/02/2022   Wt Readings from Last 5 Encounters:   12/14/23 93.4 kg (206 lb)   12/05/23 90.7 kg (200 lb)   11/28/23 90.7 kg (200 lb)   11/22/23 91 kg (200 lb 9.9 oz)   10/27/23 88 kg (194 lb)   Pain Level: 0      Performance status:  ECOG 0, fully active, able to carry on all predisease activities without

## 2024-01-30 ENCOUNTER — HOSPITAL ENCOUNTER (OUTPATIENT)
Facility: HOSPITAL | Age: 49
Discharge: HOME | End: 2024-02-02
Attending: RADIOLOGY

## 2024-01-30 LAB
RAD ONC ARIA COURSE FIRST TREATMENT DATE: NORMAL
RAD ONC ARIA COURSE ID: NORMAL
RAD ONC ARIA COURSE INTENT: NORMAL
RAD ONC ARIA COURSE LAST TREATMENT DATE: NORMAL
RAD ONC ARIA COURSE SESSION NUMBER: 11
RAD ONC ARIA COURSE START DATE: NORMAL
RAD ONC ARIA COURSE TREATMENT ELAPSED DAYS: 15
RAD ONC ARIA PLAN FRACTIONS TREATED TO DATE: 11
RAD ONC ARIA PLAN ID: NORMAL
RAD ONC ARIA PLAN PRESCRIBED DOSE PER FRACTION: 2.67 GY
RAD ONC ARIA PLAN PRIMARY REFERENCE POINT: NORMAL
RAD ONC ARIA PLAN TOTAL FRACTIONS PRESCRIBED: 16
RAD ONC ARIA PLAN TOTAL PRESCRIBED DOSE: 4272 CGY
RAD ONC ARIA REFERENCE POINT DOSAGE GIVEN TO DATE: 29.37 GY
RAD ONC ARIA REFERENCE POINT DOSAGE GIVEN TO DATE: 29.96 GY
RAD ONC ARIA REFERENCE POINT ID: NORMAL
RAD ONC ARIA REFERENCE POINT ID: NORMAL
RAD ONC ARIA REFERENCE POINT SESSION DOSAGE GIVEN: 2.67 GY
RAD ONC ARIA REFERENCE POINT SESSION DOSAGE GIVEN: 2.72 GY

## 2024-01-31 ENCOUNTER — HOSPITAL ENCOUNTER (OUTPATIENT)
Facility: HOSPITAL | Age: 49
Discharge: HOME | End: 2024-02-03
Attending: RADIOLOGY

## 2024-01-31 LAB
RAD ONC ARIA COURSE FIRST TREATMENT DATE: NORMAL
RAD ONC ARIA COURSE ID: NORMAL
RAD ONC ARIA COURSE INTENT: NORMAL
RAD ONC ARIA COURSE LAST TREATMENT DATE: NORMAL
RAD ONC ARIA COURSE SESSION NUMBER: 12
RAD ONC ARIA COURSE START DATE: NORMAL
RAD ONC ARIA COURSE TREATMENT ELAPSED DAYS: 16
RAD ONC ARIA PLAN FRACTIONS TREATED TO DATE: 12
RAD ONC ARIA PLAN ID: NORMAL
RAD ONC ARIA PLAN PRESCRIBED DOSE PER FRACTION: 2.67 GY
RAD ONC ARIA PLAN PRIMARY REFERENCE POINT: NORMAL
RAD ONC ARIA PLAN TOTAL FRACTIONS PRESCRIBED: 16
RAD ONC ARIA PLAN TOTAL PRESCRIBED DOSE: 4272 CGY
RAD ONC ARIA REFERENCE POINT DOSAGE GIVEN TO DATE: 32.04 GY
RAD ONC ARIA REFERENCE POINT DOSAGE GIVEN TO DATE: 32.69 GY
RAD ONC ARIA REFERENCE POINT ID: NORMAL
RAD ONC ARIA REFERENCE POINT ID: NORMAL
RAD ONC ARIA REFERENCE POINT SESSION DOSAGE GIVEN: 2.67 GY
RAD ONC ARIA REFERENCE POINT SESSION DOSAGE GIVEN: 2.72 GY

## 2024-02-01 ENCOUNTER — HOSPITAL ENCOUNTER (OUTPATIENT)
Facility: HOSPITAL | Age: 49
Discharge: HOME | End: 2024-02-04
Attending: RADIOLOGY

## 2024-02-01 LAB
RAD ONC ARIA COURSE FIRST TREATMENT DATE: NORMAL
RAD ONC ARIA COURSE ID: NORMAL
RAD ONC ARIA COURSE INTENT: NORMAL
RAD ONC ARIA COURSE LAST TREATMENT DATE: NORMAL
RAD ONC ARIA COURSE SESSION NUMBER: 13
RAD ONC ARIA COURSE START DATE: NORMAL
RAD ONC ARIA COURSE TREATMENT ELAPSED DAYS: 17
RAD ONC ARIA PLAN FRACTIONS TREATED TO DATE: 13
RAD ONC ARIA PLAN ID: NORMAL
RAD ONC ARIA PLAN PRESCRIBED DOSE PER FRACTION: 2.67 GY
RAD ONC ARIA PLAN PRIMARY REFERENCE POINT: NORMAL
RAD ONC ARIA PLAN TOTAL FRACTIONS PRESCRIBED: 16
RAD ONC ARIA PLAN TOTAL PRESCRIBED DOSE: 4272 CGY
RAD ONC ARIA REFERENCE POINT DOSAGE GIVEN TO DATE: 34.71 GY
RAD ONC ARIA REFERENCE POINT DOSAGE GIVEN TO DATE: 35.41 GY
RAD ONC ARIA REFERENCE POINT ID: NORMAL
RAD ONC ARIA REFERENCE POINT ID: NORMAL
RAD ONC ARIA REFERENCE POINT SESSION DOSAGE GIVEN: 2.67 GY
RAD ONC ARIA REFERENCE POINT SESSION DOSAGE GIVEN: 2.72 GY

## 2024-02-02 ENCOUNTER — HOSPITAL ENCOUNTER (OUTPATIENT)
Facility: HOSPITAL | Age: 49
Discharge: HOME | End: 2024-02-05
Attending: RADIOLOGY

## 2024-02-02 LAB
RAD ONC ARIA COURSE FIRST TREATMENT DATE: NORMAL
RAD ONC ARIA COURSE ID: NORMAL
RAD ONC ARIA COURSE INTENT: NORMAL
RAD ONC ARIA COURSE LAST TREATMENT DATE: NORMAL
RAD ONC ARIA COURSE SESSION NUMBER: 14
RAD ONC ARIA COURSE START DATE: NORMAL
RAD ONC ARIA COURSE TREATMENT ELAPSED DAYS: 18
RAD ONC ARIA PLAN FRACTIONS TREATED TO DATE: 14
RAD ONC ARIA PLAN ID: NORMAL
RAD ONC ARIA PLAN PRESCRIBED DOSE PER FRACTION: 2.67 GY
RAD ONC ARIA PLAN PRIMARY REFERENCE POINT: NORMAL
RAD ONC ARIA PLAN TOTAL FRACTIONS PRESCRIBED: 16
RAD ONC ARIA PLAN TOTAL PRESCRIBED DOSE: 4272 CGY
RAD ONC ARIA REFERENCE POINT DOSAGE GIVEN TO DATE: 37.38 GY
RAD ONC ARIA REFERENCE POINT DOSAGE GIVEN TO DATE: 38.13 GY
RAD ONC ARIA REFERENCE POINT ID: NORMAL
RAD ONC ARIA REFERENCE POINT ID: NORMAL
RAD ONC ARIA REFERENCE POINT SESSION DOSAGE GIVEN: 2.67 GY
RAD ONC ARIA REFERENCE POINT SESSION DOSAGE GIVEN: 2.72 GY

## 2024-02-05 ENCOUNTER — HOSPITAL ENCOUNTER (OUTPATIENT)
Facility: HOSPITAL | Age: 49
Discharge: HOME | End: 2024-02-08
Attending: RADIOLOGY

## 2024-02-05 DIAGNOSIS — D05.11 BREAST NEOPLASM, TIS (DCIS), RIGHT: Primary | ICD-10-CM

## 2024-02-05 LAB
RAD ONC ARIA COURSE FIRST TREATMENT DATE: NORMAL
RAD ONC ARIA COURSE ID: NORMAL
RAD ONC ARIA COURSE INTENT: NORMAL
RAD ONC ARIA COURSE LAST TREATMENT DATE: NORMAL
RAD ONC ARIA COURSE SESSION NUMBER: 15
RAD ONC ARIA COURSE START DATE: NORMAL
RAD ONC ARIA COURSE TREATMENT ELAPSED DAYS: 21
RAD ONC ARIA PLAN FRACTIONS TREATED TO DATE: 15
RAD ONC ARIA PLAN ID: NORMAL
RAD ONC ARIA PLAN PRESCRIBED DOSE PER FRACTION: 2.67 GY
RAD ONC ARIA PLAN PRIMARY REFERENCE POINT: NORMAL
RAD ONC ARIA PLAN TOTAL FRACTIONS PRESCRIBED: 16
RAD ONC ARIA PLAN TOTAL PRESCRIBED DOSE: 4272 CGY
RAD ONC ARIA REFERENCE POINT DOSAGE GIVEN TO DATE: 40.05 GY
RAD ONC ARIA REFERENCE POINT DOSAGE GIVEN TO DATE: 40.86 GY
RAD ONC ARIA REFERENCE POINT ID: NORMAL
RAD ONC ARIA REFERENCE POINT ID: NORMAL
RAD ONC ARIA REFERENCE POINT SESSION DOSAGE GIVEN: 2.67 GY
RAD ONC ARIA REFERENCE POINT SESSION DOSAGE GIVEN: 2.72 GY

## 2024-02-05 ASSESSMENT — PAIN SCALES - GENERAL: PAINLEVEL_OUTOF10: 0

## 2024-02-05 NOTE — PROGRESS NOTES
Pain Level: 0      Performance status:  ECOG 0, fully active, able to carry on all predisease activities without restrictions  General: Alert and conversant, in NAD  Skin: moderate right breast erythema, no desquamation    LABS:  Personally reviewed    MEDICATIONS:    Current Outpatient Medications:     NONFORMULARY, Take 100 mg by mouth every evening Hemp Gummies, Disp: , Rfl:     Semaglutide,0.25 or 0.5MG/DOS, (OZEMPIC, 0.25 OR 0.5 MG/DOSE,) 2 MG/1.5ML SOPN, Inject 8 mg into the skin once a week Every Thursday (Patient not taking: Reported on 12/14/2023), Disp: , Rfl:     spironolactone (ALDACTONE) 50 MG tablet, Take 1 tablet by mouth every evening (Patient not taking: Reported on 1/22/2024), Disp: , Rfl:     atorvastatin (LIPITOR) 10 MG tablet, Take 1 tablet by mouth every evening (Patient not taking: Reported on 1/22/2024), Disp: , Rfl:     MELATONIN PO, Take 10 mg by mouth every evening, Disp: , Rfl:     tiZANidine (ZANAFLEX) 4 MG capsule, Take 1 capsule by mouth as needed ceived the following from [facility]: Outside name: tiZANidine (ZANAFLEX) 6 mg capsule, Disp: , Rfl:     venlafaxine (EFFEXOR XR) 150 MG extended release capsule, Take 1 capsule by mouth every evening, Disp: , Rfl:       ASSESSMENT & PLAN:   - Continue radiation treatment as planned  - Treatment setup and plan reviewed. Port images/CBCT images reviewed. Appropriate laboratory work was reviewed.   - Treatment side effects and toxicities reviewed with the patient, and appropriate management was advised.         Manny Zapata MD  [facility]  [facility]  [address] [place] [address]  P: [phone]  [facility] [address], [place] [address]  P: [phone]  [facility]  [address] [place] [address], [address], [place] [address]  P: [phone]

## 2024-02-06 ENCOUNTER — HOSPITAL ENCOUNTER (OUTPATIENT)
Facility: HOSPITAL | Age: 49
Discharge: HOME | End: 2024-02-09
Attending: RADIOLOGY

## 2024-02-06 LAB
RAD ONC ARIA COURSE FIRST TREATMENT DATE: NORMAL
RAD ONC ARIA COURSE ID: NORMAL
RAD ONC ARIA COURSE INTENT: NORMAL
RAD ONC ARIA COURSE LAST TREATMENT DATE: NORMAL
RAD ONC ARIA COURSE SESSION NUMBER: 16
RAD ONC ARIA COURSE START DATE: NORMAL
RAD ONC ARIA COURSE TREATMENT ELAPSED DAYS: 22
RAD ONC ARIA PLAN FRACTIONS TREATED TO DATE: 16
RAD ONC ARIA PLAN ID: NORMAL
RAD ONC ARIA PLAN PRESCRIBED DOSE PER FRACTION: 2.67 GY
RAD ONC ARIA PLAN PRIMARY REFERENCE POINT: NORMAL
RAD ONC ARIA PLAN TOTAL FRACTIONS PRESCRIBED: 16
RAD ONC ARIA PLAN TOTAL PRESCRIBED DOSE: 4272 CGY
RAD ONC ARIA REFERENCE POINT DOSAGE GIVEN TO DATE: 42.72 GY
RAD ONC ARIA REFERENCE POINT DOSAGE GIVEN TO DATE: 43.58 GY
RAD ONC ARIA REFERENCE POINT ID: NORMAL
RAD ONC ARIA REFERENCE POINT ID: NORMAL
RAD ONC ARIA REFERENCE POINT SESSION DOSAGE GIVEN: 2.67 GY
RAD ONC ARIA REFERENCE POINT SESSION DOSAGE GIVEN: 2.72 GY

## 2024-02-07 ENCOUNTER — HOSPITAL ENCOUNTER (OUTPATIENT)
Facility: HOSPITAL | Age: 49
Discharge: HOME | End: 2024-02-10
Attending: RADIOLOGY

## 2024-02-07 LAB
RAD ONC ARIA COURSE FIRST TREATMENT DATE: NORMAL
RAD ONC ARIA COURSE ID: NORMAL
RAD ONC ARIA COURSE INTENT: NORMAL
RAD ONC ARIA COURSE LAST TREATMENT DATE: NORMAL
RAD ONC ARIA COURSE SESSION NUMBER: 17
RAD ONC ARIA COURSE START DATE: NORMAL
RAD ONC ARIA COURSE TREATMENT ELAPSED DAYS: 23
RAD ONC ARIA PLAN FRACTIONS TREATED TO DATE: 1
RAD ONC ARIA PLAN ID: NORMAL
RAD ONC ARIA PLAN PRESCRIBED DOSE PER FRACTION: 2.5 GY
RAD ONC ARIA PLAN PRIMARY REFERENCE POINT: NORMAL
RAD ONC ARIA PLAN TOTAL FRACTIONS PRESCRIBED: 4
RAD ONC ARIA PLAN TOTAL PRESCRIBED DOSE: 1000 CGY
RAD ONC ARIA REFERENCE POINT DOSAGE GIVEN TO DATE: 2.5 GY
RAD ONC ARIA REFERENCE POINT ID: NORMAL
RAD ONC ARIA REFERENCE POINT SESSION DOSAGE GIVEN: 2.5 GY

## 2024-02-08 ENCOUNTER — HOSPITAL ENCOUNTER (OUTPATIENT)
Facility: HOSPITAL | Age: 49
Discharge: HOME | End: 2024-02-11
Attending: RADIOLOGY

## 2024-02-08 LAB
RAD ONC ARIA COURSE FIRST TREATMENT DATE: NORMAL
RAD ONC ARIA COURSE ID: NORMAL
RAD ONC ARIA COURSE INTENT: NORMAL
RAD ONC ARIA COURSE LAST TREATMENT DATE: NORMAL
RAD ONC ARIA COURSE SESSION NUMBER: 18
RAD ONC ARIA COURSE START DATE: NORMAL
RAD ONC ARIA COURSE TREATMENT ELAPSED DAYS: 24
RAD ONC ARIA PLAN FRACTIONS TREATED TO DATE: 2
RAD ONC ARIA PLAN ID: NORMAL
RAD ONC ARIA PLAN PRESCRIBED DOSE PER FRACTION: 2.5 GY
RAD ONC ARIA PLAN PRIMARY REFERENCE POINT: NORMAL
RAD ONC ARIA PLAN TOTAL FRACTIONS PRESCRIBED: 4
RAD ONC ARIA PLAN TOTAL PRESCRIBED DOSE: 1000 CGY
RAD ONC ARIA REFERENCE POINT DOSAGE GIVEN TO DATE: 5 GY
RAD ONC ARIA REFERENCE POINT ID: NORMAL
RAD ONC ARIA REFERENCE POINT SESSION DOSAGE GIVEN: 2.5 GY

## 2024-02-09 ENCOUNTER — HOSPITAL ENCOUNTER (OUTPATIENT)
Facility: HOSPITAL | Age: 49
Discharge: HOME | End: 2024-02-12
Attending: RADIOLOGY

## 2024-02-09 LAB
RAD ONC ARIA COURSE FIRST TREATMENT DATE: NORMAL
RAD ONC ARIA COURSE ID: NORMAL
RAD ONC ARIA COURSE INTENT: NORMAL
RAD ONC ARIA COURSE LAST TREATMENT DATE: NORMAL
RAD ONC ARIA COURSE SESSION NUMBER: 19
RAD ONC ARIA COURSE START DATE: NORMAL
RAD ONC ARIA COURSE TREATMENT ELAPSED DAYS: 25
RAD ONC ARIA PLAN FRACTIONS TREATED TO DATE: 3
RAD ONC ARIA PLAN ID: NORMAL
RAD ONC ARIA PLAN PRESCRIBED DOSE PER FRACTION: 2.5 GY
RAD ONC ARIA PLAN PRIMARY REFERENCE POINT: NORMAL
RAD ONC ARIA PLAN TOTAL FRACTIONS PRESCRIBED: 4
RAD ONC ARIA PLAN TOTAL PRESCRIBED DOSE: 1000 CGY
RAD ONC ARIA REFERENCE POINT DOSAGE GIVEN TO DATE: 7.5 GY
RAD ONC ARIA REFERENCE POINT ID: NORMAL
RAD ONC ARIA REFERENCE POINT SESSION DOSAGE GIVEN: 2.5 GY

## 2024-02-12 ENCOUNTER — HOSPITAL ENCOUNTER (OUTPATIENT)
Facility: HOSPITAL | Age: 49
Discharge: HOME | End: 2024-02-15
Attending: RADIOLOGY

## 2024-02-12 DIAGNOSIS — D05.11 BREAST NEOPLASM, TIS (DCIS), RIGHT: Primary | ICD-10-CM

## 2024-02-12 LAB
RAD ONC ARIA COURSE FIRST TREATMENT DATE: NORMAL
RAD ONC ARIA COURSE ID: NORMAL
RAD ONC ARIA COURSE INTENT: NORMAL
RAD ONC ARIA COURSE LAST TREATMENT DATE: NORMAL
RAD ONC ARIA COURSE SESSION NUMBER: 20
RAD ONC ARIA COURSE START DATE: NORMAL
RAD ONC ARIA COURSE TREATMENT ELAPSED DAYS: 28
RAD ONC ARIA PLAN FRACTIONS TREATED TO DATE: 4
RAD ONC ARIA PLAN ID: NORMAL
RAD ONC ARIA PLAN PRESCRIBED DOSE PER FRACTION: 2.5 GY
RAD ONC ARIA PLAN PRIMARY REFERENCE POINT: NORMAL
RAD ONC ARIA PLAN TOTAL FRACTIONS PRESCRIBED: 4
RAD ONC ARIA PLAN TOTAL PRESCRIBED DOSE: 1000 CGY
RAD ONC ARIA REFERENCE POINT DOSAGE GIVEN TO DATE: 10 GY
RAD ONC ARIA REFERENCE POINT ID: NORMAL
RAD ONC ARIA REFERENCE POINT SESSION DOSAGE GIVEN: 2.5 GY

## 2024-02-13 ENCOUNTER — CLINICAL DOCUMENTATION (OUTPATIENT)
Facility: HOSPITAL | Age: 49
End: 2024-02-13

## 2024-02-13 NOTE — PROGRESS NOTES
Cancer [place] at [facility]  [facility]      RADIATION ONCOLOGY CLINICAL END OF TREATMENT NOTE    Encounter Date: 02/13/24   Patient Name: Roz Amin  YOB: 1975  Medical Record Number: [id number]    DIAGNOSIS:         ICD-10-CM     1. Breast neoplasm, Tis (DCIS), right  D05.11           STAGING:   Cancer Staging   Breast neoplasm, Tis (DCIS), right  Staging form: Breast, AJCC 8th Edition  - Clinical stage from 10/17/2023: Stage 0 (cTis (DCIS), cN0, cM0, ER+, MI+, HER2: Not Assessed) - Signed by Cecelia Jones MD on 10/17/2023  - Pathologic stage from 12/1/2023: Stage 0 (pTis (DCIS), pN0, cM0, ER+, MI: Not Assessed, HER2: Not Assessed) - Signed by Cecelia Jones MD on 12/1/2023  AJCC Staging has been reviewed    ASSESSMENT:   49 yo F with RIGHT breast DCIS ER+/MI+ status post lumpectomy (pTisNx, grade 1, neg margin)     TREATMENT SUMMARY:     Treatment Site Modality Dose/Fx (cGy) #Fx Total Dose (cGy) Start Date End Date Elapsed Days   Right breast 3D- 28 2784 1/15/24 2/6/24 23   Right lumpectomy bed boost en face Electron 250 4 1000 2/7/24 2/12/24 6       CONCURRENT THERAPY: None    TREATMENT RESPONSE:  Treatment completed as planned      MEDICATIONS:     Current Outpatient Medications:     NONFORMULARY, Take 100 mg by mouth every evening Hemp Gummies, Disp: , Rfl:     Semaglutide,0.25 or 0.5MG/DOS, (OZEMPIC, 0.25 OR 0.5 MG/DOSE,) 2 MG/1.5ML SOPN, Inject 8 mg into the skin once a week Every Thursday (Patient not taking: Reported on 12/14/2023), Disp: , Rfl:     spironolactone (ALDACTONE) 50 MG tablet, Take 1 tablet by mouth every evening (Patient not taking: Reported on 1/22/2024), Disp: , Rfl:     atorvastatin (LIPITOR) 10 MG tablet, Take 1 tablet by mouth every evening (Patient not taking: Reported on 1/22/2024), Disp: , Rfl:     MELATONIN PO, Take 10 mg by mouth every evening, Disp: , Rfl:     tiZANidine (ZANAFLEX) 4 MG capsule,

## 2024-03-07 ENCOUNTER — OFFICE VISIT (OUTPATIENT)
Age: 49
End: 2024-03-07
Payer: COMMERCIAL

## 2024-03-07 VITALS — WEIGHT: 206 LBS | BODY MASS INDEX: 35.17 KG/M2 | HEIGHT: 64 IN

## 2024-03-07 DIAGNOSIS — D05.11 BREAST NEOPLASM, TIS (DCIS), RIGHT: Primary | ICD-10-CM

## 2024-03-07 PROCEDURE — 76642 ULTRASOUND BREAST LIMITED: CPT | Performed by: SURGERY

## 2024-03-07 PROCEDURE — 99213 OFFICE O/P EST LOW 20 MIN: CPT | Performed by: SURGERY

## 2024-03-07 NOTE — PROGRESS NOTES
HISTORY OF PRESENT ILLNESS  Roz Amin is a 48 y.o. female     HPI ESTABLISHED patient here for follow up RIGHT breast cancer.  5 months s/p RIGHT lumpectomy.  The RIGHT breast feels like there is a fluid.  Some zingers, and it hurts to lay on her belly.    11/2023 RIGHT lumpectomy 9mm DCIS, grade 1, ER 90%, MS 50%  XRT 2/2024  BRCA neg ([facility])      Review of Systems      Physical Exam  Chest:   Breasts:     Right: Skin change (RIGHT breast) present. No tenderness.          BREAST ULTRASOUND  Indication: RIGHT breast density after lumpectomy and XRT  Technique:  The RIGHT breast and axilla were scanned using a high-frequency linear-array near-field transducer  Findings: scar tissue at lumpectomy site.  Lateral to the scar tissue is a biopsy clip.  This is the clip NOT associated with cancer.  Impression: scar  Disposition: No worrisome finding on ultrasound     ASSESSMENT and PLAN   Diagnosis Orders   1. Breast neoplasm, Tis (DCIS), right  ANTOINE MAKAYLA DIGITAL DIAGNOSTIC BILATERAL      I spent 20 minutes reviewing previous notes and test results, face to face with the patient discussing diagnosis and treatment options, and documenting today's visit.    RIGHT breast pain and full feeling after lumpectomy and XRT.   Mostly uncomfortable when she lays on her belly.  She has some peeling skin and eschar from recent radiation.   Scar tissue seen on US.  No seroma.  Pain should decrease as tissue heals from radiation.  Dx mammogram 9/2024, and visit with me.  Then annual dx mammogram and visit x 4 more years.

## 2024-05-13 DIAGNOSIS — M89.8X9 BONE PAIN: Primary | ICD-10-CM

## 2024-05-13 DIAGNOSIS — Z85.3 PERSONAL HISTORY OF BREAST CANCER: ICD-10-CM

## 2024-05-16 ENCOUNTER — HOSPITAL ENCOUNTER (OUTPATIENT)
Facility: HOSPITAL | Age: 49
Discharge: HOME | End: 2024-05-16
Attending: RADIOLOGY
Payer: COMMERCIAL

## 2024-05-16 ENCOUNTER — HOSPITAL ENCOUNTER (OUTPATIENT)
Facility: HOSPITAL | Age: 49
End: 2024-05-16
Attending: RADIOLOGY
Payer: COMMERCIAL

## 2024-05-16 ENCOUNTER — TELEPHONE (OUTPATIENT)
Facility: HOSPITAL | Age: 49
End: 2024-05-16

## 2024-05-16 DIAGNOSIS — Z85.3 PERSONAL HISTORY OF BREAST CANCER: ICD-10-CM

## 2024-05-16 DIAGNOSIS — M89.8X9 BONE PAIN: ICD-10-CM

## 2024-05-16 PROCEDURE — A9503 TC99M MEDRONATE: HCPCS | Performed by: RADIOLOGY

## 2024-05-16 PROCEDURE — 3430000000 HC RX DIAGNOSTIC RADIOPHARMACEUTICAL: Performed by: RADIOLOGY

## 2024-05-16 PROCEDURE — 78306 BONE IMAGING WHOLE BODY: CPT | Performed by: RADIOLOGY

## 2024-05-16 RX ORDER — TC 99M MEDRONATE 20 MG/10ML
21.3 INJECTION, POWDER, LYOPHILIZED, FOR SOLUTION INTRAVENOUS
Status: COMPLETED | OUTPATIENT
Start: 2024-05-16 | End: 2024-05-16

## 2024-05-16 RX ADMIN — TC 99M MEDRONATE 21.3 MILLICURIE: 20 INJECTION, POWDER, LYOPHILIZED, FOR SOLUTION INTRAVENOUS at 10:25

## 2024-05-31 ENCOUNTER — HOSPITAL ENCOUNTER (OUTPATIENT)
Facility: HOSPITAL | Age: 49
End: 2024-05-31
Attending: RADIOLOGY

## 2024-05-31 VITALS
BODY MASS INDEX: 33.8 KG/M2 | DIASTOLIC BLOOD PRESSURE: 82 MMHG | HEART RATE: 98 BPM | SYSTOLIC BLOOD PRESSURE: 155 MMHG | RESPIRATION RATE: 16 BRPM | HEIGHT: 64 IN | OXYGEN SATURATION: 97 % | WEIGHT: 198 LBS

## 2024-05-31 DIAGNOSIS — D05.11 BREAST NEOPLASM, TIS (DCIS), RIGHT: Primary | ICD-10-CM

## 2024-05-31 RX ORDER — TIRZEPATIDE 5 MG/.5ML
INJECTION, SOLUTION SUBCUTANEOUS
COMMUNITY
Start: 2024-05-29

## 2024-05-31 RX ORDER — DESVENLAFAXINE 100 MG/1
100 TABLET, EXTENDED RELEASE ORAL
COMMUNITY
Start: 2024-04-29 | End: 2024-07-28

## 2024-05-31 ASSESSMENT — PAIN DESCRIPTION - DESCRIPTORS: DESCRIPTORS: TENDER

## 2024-05-31 ASSESSMENT — PAIN SCALES - GENERAL: PAINLEVEL_OUTOF10: 2

## 2024-05-31 ASSESSMENT — PAIN DESCRIPTION - LOCATION: LOCATION: BREAST

## 2024-05-31 ASSESSMENT — PAIN DESCRIPTION - ORIENTATION: ORIENTATION: RIGHT

## 2024-05-31 NOTE — PROGRESS NOTES
Cancer [place] at [facility]  [facility]      RADIATION ONCOLOGY FOLLOW-UP VISIT NOTE     Encounter Date: 05/31/24   Patient Name: Roz Amin  YOB: 1975  Medical Record Number: [id number]  Referring Physician: Cecelia Jones MD  [address] [place] [address]  Primary Care Provider: Agnieszka Montoya MD  Surgical oncologist: Cecelia Jones      DIAGNOSIS:       ICD-10-CM    1. Breast neoplasm, Tis (DCIS), right  D05.11         STAGING:    Cancer Staging   Breast neoplasm, Tis (DCIS), right  Staging form: Breast, AJCC 8th Edition  - Clinical stage from 10/17/2023: Stage 0 (cTis (DCIS), cN0, cM0, ER+, NC+, HER2: Not Assessed) - Signed by Cecelia Jones MD on 10/17/2023  - Pathologic stage from 12/1/2023: Stage 0 (pTis (DCIS), pN0, cM0, ER+, NC: Not Assessed, HER2: Not Assessed) - Signed by Cecelia Jones MD on 12/1/2023  AJCC Staging has been reviewed      ONCOLOGIC HISTORY:   9/6/23 - bilateral screening mammogram finds focal asymmetry in LOQ of left breast and a group of calcifications and asymmetry in lateral, deep third of RIGHT breast.      9/20/23 - diagnostic bilateral mammogram finds grouped calcifications and a persistent focal asymmetry in UOQ of RIGHT breast. Also a persistent punctate asymmetry in UOQ left breast.      Targeted bilateral US finds a 6 x 4mm lesion at 10:00 position of RIGHT breast with adjacent cyst.  LEFT breast there are several clustered microcysts at 3:00 position, conglomerate measuring 8mm. No axillary adenopathy.      10/5/23 - biopsy of RIGHT 10:00 cystic mass is benign.      10/12/23 - biopsy of RIGHT breast calcifications shows DCIS, grade 1, cribriform and micropapillary types, ER+ (90%), NC+ (50%).      10/23/23 - breast MRI confirms a 3.5 x 1.9 x 1.4cm area of nonmass enhancement at 10:00 position of RIGHT breast with associated biopsy clip. Left breast is negative. No adenopathy.

## 2024-09-09 ENCOUNTER — OFFICE VISIT (OUTPATIENT)
Age: 49
End: 2024-09-09
Payer: COMMERCIAL

## 2024-09-09 VITALS — HEART RATE: 103 BPM | SYSTOLIC BLOOD PRESSURE: 157 MMHG | DIASTOLIC BLOOD PRESSURE: 87 MMHG

## 2024-09-09 DIAGNOSIS — Z01.419 ENCOUNTER FOR GYNECOLOGICAL EXAMINATION: Primary | ICD-10-CM

## 2024-09-09 DIAGNOSIS — D05.11 BREAST NEOPLASM, TIS (DCIS), RIGHT: ICD-10-CM

## 2024-09-09 DIAGNOSIS — N94.10 DYSPAREUNIA IN FEMALE: ICD-10-CM

## 2024-09-09 PROCEDURE — 99459 PELVIC EXAMINATION: CPT | Performed by: OBSTETRICS & GYNECOLOGY

## 2024-09-09 PROCEDURE — 99396 PREV VISIT EST AGE 40-64: CPT | Performed by: OBSTETRICS & GYNECOLOGY

## 2024-09-09 RX ORDER — DOCUSATE SODIUM 100 MG/1
100 CAPSULE, LIQUID FILLED ORAL 2 TIMES DAILY
COMMUNITY

## 2024-09-17 ENCOUNTER — OFFICE VISIT (OUTPATIENT)
Age: 49
End: 2024-09-17
Payer: COMMERCIAL

## 2024-09-17 ENCOUNTER — HOSPITAL ENCOUNTER (OUTPATIENT)
Facility: HOSPITAL | Age: 49
Discharge: HOME | End: 2024-09-20
Payer: COMMERCIAL

## 2024-09-17 VITALS — WEIGHT: 198 LBS | HEIGHT: 64 IN | BODY MASS INDEX: 33.8 KG/M2

## 2024-09-17 DIAGNOSIS — D05.11 BREAST NEOPLASM, TIS (DCIS), RIGHT: ICD-10-CM

## 2024-09-17 DIAGNOSIS — Z86.000 HISTORY OF DUCTAL CARCINOMA IN SITU (DCIS) OF BREAST: Primary | ICD-10-CM

## 2024-09-17 PROCEDURE — 99213 OFFICE O/P EST LOW 20 MIN: CPT | Performed by: SURGERY

## 2024-09-17 PROCEDURE — G0279 TOMOSYNTHESIS, MAMMO: HCPCS

## 2024-11-06 NOTE — PERIOP NOTE
Patient takes Mounjaro for weight loss. Patient's last dose will be 11/14/24. Patient voiced understanding.

## 2024-11-14 ENCOUNTER — HOSPITAL ENCOUNTER (OUTPATIENT)
Facility: HOSPITAL | Age: 49
Discharge: HOME | End: 2024-11-17
Payer: COMMERCIAL

## 2024-11-14 VITALS
TEMPERATURE: 97.3 F | BODY MASS INDEX: 33.46 KG/M2 | HEIGHT: 64 IN | OXYGEN SATURATION: 97 % | WEIGHT: 195.99 LBS | SYSTOLIC BLOOD PRESSURE: 143 MMHG | RESPIRATION RATE: 14 BRPM | DIASTOLIC BLOOD PRESSURE: 93 MMHG

## 2024-11-14 LAB
ALBUMIN SERPL-MCNC: 3.7 G/DL (ref 3.5–5)
ALBUMIN/GLOB SERPL: 1.2 (ref 1.1–2.2)
ALP SERPL-CCNC: 92 U/L (ref 45–117)
ALT SERPL-CCNC: 21 U/L (ref 12–78)
ANION GAP SERPL CALC-SCNC: 5 MMOL/L (ref 2–12)
APTT PPP: 25.6 SEC (ref 22.1–31)
AST SERPL-CCNC: 12 U/L (ref 15–37)
BASOPHILS # BLD: 0.1 K/UL (ref 0–0.1)
BASOPHILS NFR BLD: 1 % (ref 0–1)
BILIRUB SERPL-MCNC: 0.4 MG/DL (ref 0.2–1)
BUN SERPL-MCNC: 9 MG/DL (ref 6–20)
BUN/CREAT SERPL: 11 (ref 12–20)
CALCIUM SERPL-MCNC: 9.1 MG/DL (ref 8.5–10.1)
CHLORIDE SERPL-SCNC: 106 MMOL/L (ref 97–108)
CO2 SERPL-SCNC: 26 MMOL/L (ref 21–32)
CREAT SERPL-MCNC: 0.85 MG/DL (ref 0.55–1.02)
DIFFERENTIAL METHOD BLD: ABNORMAL
EKG ATRIAL RATE: 81 BPM
EKG DIAGNOSIS: NORMAL
EKG P AXIS: 52 DEGREES
EKG P-R INTERVAL: 150 MS
EKG Q-T INTERVAL: 376 MS
EKG QRS DURATION: 84 MS
EKG QTC CALCULATION (BAZETT): 436 MS
EKG R AXIS: 18 DEGREES
EKG T AXIS: 32 DEGREES
EKG VENTRICULAR RATE: 81 BPM
EOSINOPHIL # BLD: 0.2 K/UL (ref 0–0.4)
EOSINOPHIL NFR BLD: 3 % (ref 0–7)
ERYTHROCYTE [DISTWIDTH] IN BLOOD BY AUTOMATED COUNT: 13.2 % (ref 11.5–14.5)
GLOBULIN SER CALC-MCNC: 3.2 G/DL (ref 2–4)
GLUCOSE SERPL-MCNC: 95 MG/DL (ref 65–100)
HCT VFR BLD AUTO: 43.8 % (ref 35–47)
HGB BLD-MCNC: 14.6 G/DL (ref 11.5–16)
IMM GRANULOCYTES # BLD AUTO: 0 K/UL (ref 0–0.04)
IMM GRANULOCYTES NFR BLD AUTO: 1 % (ref 0–0.5)
INR PPP: 0.9 (ref 0.9–1.1)
LYMPHOCYTES # BLD: 2 K/UL (ref 0.8–3.5)
LYMPHOCYTES NFR BLD: 29 % (ref 12–49)
MCH RBC QN AUTO: 28.1 PG (ref 26–34)
MCHC RBC AUTO-ENTMCNC: 33.3 G/DL (ref 30–36.5)
MCV RBC AUTO: 84.4 FL (ref 80–99)
MONOCYTES # BLD: 0.4 K/UL (ref 0–1)
MONOCYTES NFR BLD: 6 % (ref 5–13)
NEUTS SEG # BLD: 4 K/UL (ref 1.8–8)
NEUTS SEG NFR BLD: 60 % (ref 32–75)
NRBC # BLD: 0 K/UL (ref 0–0.01)
NRBC BLD-RTO: 0 PER 100 WBC
PLATELET # BLD AUTO: 346 K/UL (ref 150–400)
PMV BLD AUTO: 10 FL (ref 8.9–12.9)
POTASSIUM SERPL-SCNC: 4.5 MMOL/L (ref 3.5–5.1)
PROT SERPL-MCNC: 6.9 G/DL (ref 6.4–8.2)
PROTHROMBIN TIME: 9.9 SEC (ref 9–11.1)
RBC # BLD AUTO: 5.19 M/UL (ref 3.8–5.2)
SODIUM SERPL-SCNC: 137 MMOL/L (ref 136–145)
THERAPEUTIC RANGE: NORMAL SECS (ref 58–77)
WBC # BLD AUTO: 6.7 K/UL (ref 3.6–11)

## 2024-11-14 PROCEDURE — 93005 ELECTROCARDIOGRAM TRACING: CPT | Performed by: SURGERY

## 2024-11-14 PROCEDURE — 36415 COLL VENOUS BLD VENIPUNCTURE: CPT

## 2024-11-14 PROCEDURE — 93010 ELECTROCARDIOGRAM REPORT: CPT | Performed by: INTERNAL MEDICINE

## 2024-11-14 PROCEDURE — 85730 THROMBOPLASTIN TIME PARTIAL: CPT

## 2024-11-14 PROCEDURE — 85610 PROTHROMBIN TIME: CPT

## 2024-11-14 PROCEDURE — 85025 COMPLETE CBC W/AUTO DIFF WBC: CPT

## 2024-11-14 PROCEDURE — 80053 COMPREHEN METABOLIC PANEL: CPT

## 2024-11-14 NOTE — DISCHARGE INSTRUCTIONS
[facility]                   [address] [place] [address]   MAIN OR                                  [phone]   MAIN PRE OP                          [phone]                                                                                AMBULATORY PRE OP          [phone]  PRE-ADMISSION TESTING    [phone]     Surgery Date:   November 27 (Wednesday)      Is surgery arrival time given by surgeon?    NO  If “NO”, [facility] staff will call you between 3 and 7pm the day before your surgery with your arrival time. (If your surgery is on a Monday, we will call you the Friday before.)    Call [phone] after 7pm Monday-Friday if you did not receive this call.    INSTRUCTIONS BEFORE YOUR SURGERY   When You  Arrive Arrive at the 2nd Floor Admitting Desk on the day of your surgery  Have your insurance card, photo ID, and any copayment (if needed)   Food   and   Drink NO food or drink after midnight the night before surgery    This means NO gum, mints, coffee, juice, etc.  Clear water may be consumed up to 2 hours before surgery.  No alcohol (beer, wine, liquor) 24 hours before and after surgery   Medications to   TAKE   Morning of Surgery MEDICATIONS TO TAKE THE MORNING OF SURGERY WITH A SIP OF WATER:     Pristiq    Do not take morning of surgery - Docusate, Mucinex    Do not take Mounjaro within 7 days of surgery.     Medications  To  STOP      7 days before surgery Non-Steroidal anti-inflammatory Drugs (NSAID's): for example, Ibuprofen (Advil, Motrin), Naproxen (Aleve)  Aspirin, if taking for pain   Herbal supplements, vitamins, and fish oil  Other:  (Pain medications not listed above, including Tylenol may be taken)   Blood  Thinners If you take  Aspirin, Plavix, Coumadin, or any blood-thinning or anti-blood clot medicine, talk to the doctor who prescribed the medications for pre-operative instructions.   Bathing Clothing  Jewelry  Valuables     If you shower  the morning of surgery, please do not apply anything to your skin (lotions, powders, deodorant, or makeup, especially mascara)  Follow Chlorhexidine Care Fusion body wash instructions provided to you during PAT appointment. Begin 3 days prior to surgery.  Do not shave or trim anywhere 24 hours before surgery  Wear your hair loose or down; no pony-tails, buns, or metal hair clips  Wear loose, comfortable, clean clothes  Wear glasses instead of contacts  Leave money, valuables, and jewelry, including body piercings, at home     Going Home - or Spending the Night SAME-DAY SURGERY: You must have a responsible adult drive you home and stay with you 24 hours after surgery  ADMITS: If your doctor is keeping you in the hospital after surgery, leave personal belongings/luggage in your car until you have a hospital room number.    Hospital discharge time is 12 noon  Drivers must be here before 12 noon unless you are told differently   Special Instructions        Follow all instructions so your surgery won’t be cancelled.  Please, be on time.                    If a situation occurs and you are delayed the day of surgery, call [phone].    If your physical condition changes (like a fever, cold, flu, etc.) call your surgeon.    Home medication(s) reviewed and verified via    LIST   VERBAL   during PAT appointment.    The patient was contacted by   IN-PERSON  The patient verbalizes understanding of all instructions and    DOES NOT   need reinforcement.

## 2024-11-26 NOTE — PERIOP NOTE
Spoke to Cydney from Dr. Brito's office requesting updated consent orders.  The patient is now having a bilateral mastopexy with possilbe free nipple graft, instead of a bilateral breast reduction with possible free nipple grafts.  Cydney to fax the updated consent orders to PAT.  DOS: 11/27/2024

## 2024-11-26 NOTE — PERIOP NOTE
Updated consent orders received.  A new consent will need to be completed and signed on the day of surgery.  DOS: 11/27/2024

## 2024-11-27 ENCOUNTER — ANESTHESIA EVENT (OUTPATIENT)
Facility: HOSPITAL | Age: 49
End: 2024-11-27
Payer: COMMERCIAL

## 2024-11-27 ENCOUNTER — HOSPITAL ENCOUNTER (OUTPATIENT)
Facility: HOSPITAL | Age: 49
Setting detail: OUTPATIENT SURGERY
Discharge: HOME | End: 2024-11-27
Attending: SURGERY | Admitting: SURGERY
Payer: COMMERCIAL

## 2024-11-27 ENCOUNTER — ANESTHESIA (OUTPATIENT)
Facility: HOSPITAL | Age: 49
End: 2024-11-27
Payer: COMMERCIAL

## 2024-11-27 VITALS
TEMPERATURE: 98.4 F | HEIGHT: 64 IN | SYSTOLIC BLOOD PRESSURE: 124 MMHG | RESPIRATION RATE: 24 BRPM | WEIGHT: 199.08 LBS | OXYGEN SATURATION: 91 % | DIASTOLIC BLOOD PRESSURE: 73 MMHG | BODY MASS INDEX: 33.99 KG/M2 | HEART RATE: 101 BPM

## 2024-11-27 LAB
ERYTHROCYTE [DISTWIDTH] IN BLOOD BY AUTOMATED COUNT: 13.4 % (ref 11.5–14.5)
HCT VFR BLD AUTO: 39.8 % (ref 35–47)
HGB BLD-MCNC: 13 G/DL (ref 11.5–16)
MCH RBC QN AUTO: 28.1 PG (ref 26–34)
MCHC RBC AUTO-ENTMCNC: 32.7 G/DL (ref 30–36.5)
MCV RBC AUTO: 86.1 FL (ref 80–99)
NRBC # BLD: 0 K/UL (ref 0–0.01)
NRBC BLD-RTO: 0 PER 100 WBC
PLATELET # BLD AUTO: 304 K/UL (ref 150–400)
PMV BLD AUTO: 10.6 FL (ref 8.9–12.9)
RBC # BLD AUTO: 4.62 M/UL (ref 3.8–5.2)
WBC # BLD AUTO: 7.4 K/UL (ref 3.6–11)

## 2024-11-27 PROCEDURE — 88305 TISSUE EXAM BY PATHOLOGIST: CPT

## 2024-11-27 PROCEDURE — 2500000003 HC RX 250 WO HCPCS: Performed by: ANESTHESIOLOGY

## 2024-11-27 PROCEDURE — 7100000011 HC PHASE II RECOVERY - ADDTL 15 MIN: Performed by: SURGERY

## 2024-11-27 PROCEDURE — 2580000003 HC RX 258: Performed by: ANESTHESIOLOGY

## 2024-11-27 PROCEDURE — 7100000001 HC PACU RECOVERY - ADDTL 15 MIN: Performed by: SURGERY

## 2024-11-27 PROCEDURE — 6360000002 HC RX W HCPCS: Performed by: ANESTHESIOLOGY

## 2024-11-27 PROCEDURE — 6370000000 HC RX 637 (ALT 250 FOR IP): Performed by: SURGERY

## 2024-11-27 PROCEDURE — 2720000010 HC SURG SUPPLY STERILE: Performed by: SURGERY

## 2024-11-27 PROCEDURE — 7100000010 HC PHASE II RECOVERY - FIRST 15 MIN: Performed by: SURGERY

## 2024-11-27 PROCEDURE — 3600000003 HC SURGERY LEVEL 3 BASE: Performed by: SURGERY

## 2024-11-27 PROCEDURE — 36415 COLL VENOUS BLD VENIPUNCTURE: CPT

## 2024-11-27 PROCEDURE — 85027 COMPLETE CBC AUTOMATED: CPT

## 2024-11-27 PROCEDURE — 2709999900 HC NON-CHARGEABLE SUPPLY: Performed by: SURGERY

## 2024-11-27 PROCEDURE — 3700000000 HC ANESTHESIA ATTENDED CARE: Performed by: SURGERY

## 2024-11-27 PROCEDURE — 6360000002 HC RX W HCPCS: Performed by: SURGERY

## 2024-11-27 PROCEDURE — 2580000003 HC RX 258: Performed by: SURGERY

## 2024-11-27 PROCEDURE — 3600000013 HC SURGERY LEVEL 3 ADDTL 15MIN: Performed by: SURGERY

## 2024-11-27 PROCEDURE — 3700000001 HC ADD 15 MINUTES (ANESTHESIA): Performed by: SURGERY

## 2024-11-27 PROCEDURE — 7100000000 HC PACU RECOVERY - FIRST 15 MIN: Performed by: SURGERY

## 2024-11-27 PROCEDURE — A4217 STERILE WATER/SALINE, 500 ML: HCPCS | Performed by: SURGERY

## 2024-11-27 PROCEDURE — 88307 TISSUE EXAM BY PATHOLOGIST: CPT

## 2024-11-27 RX ORDER — ROCURONIUM BROMIDE 10 MG/ML
INJECTION, SOLUTION INTRAVENOUS
Status: DISCONTINUED | OUTPATIENT
Start: 2024-11-27 | End: 2024-11-27 | Stop reason: SDUPTHER

## 2024-11-27 RX ORDER — LIDOCAINE HYDROCHLORIDE 20 MG/ML
INJECTION, SOLUTION EPIDURAL; INFILTRATION; INTRACAUDAL; PERINEURAL
Status: DISCONTINUED | OUTPATIENT
Start: 2024-11-27 | End: 2024-11-27 | Stop reason: SDUPTHER

## 2024-11-27 RX ORDER — FENTANYL CITRATE 50 UG/ML
INJECTION, SOLUTION INTRAMUSCULAR; INTRAVENOUS
Status: DISCONTINUED | OUTPATIENT
Start: 2024-11-27 | End: 2024-11-27 | Stop reason: SDUPTHER

## 2024-11-27 RX ORDER — IPRATROPIUM BROMIDE AND ALBUTEROL SULFATE 2.5; .5 MG/3ML; MG/3ML
1 SOLUTION RESPIRATORY (INHALATION)
Status: DISCONTINUED | OUTPATIENT
Start: 2024-11-27 | End: 2024-11-27 | Stop reason: HOSPADM

## 2024-11-27 RX ORDER — FENTANYL CITRATE 50 UG/ML
25 INJECTION, SOLUTION INTRAMUSCULAR; INTRAVENOUS
Status: DISCONTINUED | OUTPATIENT
Start: 2024-11-27 | End: 2024-11-27 | Stop reason: HOSPADM

## 2024-11-27 RX ORDER — LIDOCAINE HYDROCHLORIDE 10 MG/ML
1 INJECTION, SOLUTION EPIDURAL; INFILTRATION; INTRACAUDAL; PERINEURAL
Status: DISCONTINUED | OUTPATIENT
Start: 2024-11-27 | End: 2024-11-27 | Stop reason: HOSPADM

## 2024-11-27 RX ORDER — SODIUM CHLORIDE 9 MG/ML
INJECTION, SOLUTION INTRAVENOUS CONTINUOUS
Status: DISCONTINUED | OUTPATIENT
Start: 2024-11-27 | End: 2024-11-27 | Stop reason: HOSPADM

## 2024-11-27 RX ORDER — ONDANSETRON 2 MG/ML
INJECTION INTRAMUSCULAR; INTRAVENOUS
Status: DISCONTINUED | OUTPATIENT
Start: 2024-11-27 | End: 2024-11-27 | Stop reason: SDUPTHER

## 2024-11-27 RX ORDER — MEPERIDINE HYDROCHLORIDE 25 MG/ML
12.5 INJECTION INTRAMUSCULAR; INTRAVENOUS; SUBCUTANEOUS EVERY 5 MIN PRN
Status: DISCONTINUED | OUTPATIENT
Start: 2024-11-27 | End: 2024-11-27 | Stop reason: HOSPADM

## 2024-11-27 RX ORDER — MIDAZOLAM HYDROCHLORIDE 1 MG/ML
INJECTION, SOLUTION INTRAMUSCULAR; INTRAVENOUS
Status: DISCONTINUED | OUTPATIENT
Start: 2024-11-27 | End: 2024-11-27 | Stop reason: SDUPTHER

## 2024-11-27 RX ORDER — DROPERIDOL 2.5 MG/ML
0.62 INJECTION, SOLUTION INTRAMUSCULAR; INTRAVENOUS
Status: DISCONTINUED | OUTPATIENT
Start: 2024-11-27 | End: 2024-11-27 | Stop reason: HOSPADM

## 2024-11-27 RX ORDER — PROPOFOL 10 MG/ML
INJECTION, EMULSION INTRAVENOUS
Status: DISCONTINUED | OUTPATIENT
Start: 2024-11-27 | End: 2024-11-27 | Stop reason: SDUPTHER

## 2024-11-27 RX ORDER — ALBUTEROL SULFATE 0.83 MG/ML
2.5 SOLUTION RESPIRATORY (INHALATION)
Status: DISCONTINUED | OUTPATIENT
Start: 2024-11-27 | End: 2024-11-27 | Stop reason: HOSPADM

## 2024-11-27 RX ORDER — ACETAMINOPHEN 500 MG
1000 TABLET ORAL ONCE
Status: COMPLETED | OUTPATIENT
Start: 2024-11-27 | End: 2024-11-27

## 2024-11-27 RX ORDER — LEVOFLOXACIN 5 MG/ML
500 INJECTION, SOLUTION INTRAVENOUS ONCE
Status: COMPLETED | OUTPATIENT
Start: 2024-11-27 | End: 2024-11-27

## 2024-11-27 RX ORDER — HYDROMORPHONE HYDROCHLORIDE 1 MG/ML
1 INJECTION, SOLUTION INTRAMUSCULAR; INTRAVENOUS; SUBCUTANEOUS EVERY 5 MIN PRN
Status: DISCONTINUED | OUTPATIENT
Start: 2024-11-27 | End: 2024-11-27 | Stop reason: HOSPADM

## 2024-11-27 RX ORDER — PHENYLEPHRINE HCL IN 0.9% NACL 0.4MG/10ML
SYRINGE (ML) INTRAVENOUS
Status: DISCONTINUED | OUTPATIENT
Start: 2024-11-27 | End: 2024-11-27 | Stop reason: SDUPTHER

## 2024-11-27 RX ORDER — ACETAMINOPHEN 325 MG/1
650 TABLET ORAL ONCE
Status: DISCONTINUED | OUTPATIENT
Start: 2024-11-27 | End: 2024-11-27 | Stop reason: SDUPTHER

## 2024-11-27 RX ORDER — OXYCODONE HYDROCHLORIDE 5 MG/1
5 TABLET ORAL
Status: DISCONTINUED | OUTPATIENT
Start: 2024-11-27 | End: 2024-11-27 | Stop reason: HOSPADM

## 2024-11-27 RX ORDER — LABETALOL HYDROCHLORIDE 5 MG/ML
10 INJECTION, SOLUTION INTRAVENOUS
Status: DISCONTINUED | OUTPATIENT
Start: 2024-11-27 | End: 2024-11-27 | Stop reason: HOSPADM

## 2024-11-27 RX ORDER — NALOXONE HYDROCHLORIDE 0.4 MG/ML
INJECTION, SOLUTION INTRAMUSCULAR; INTRAVENOUS; SUBCUTANEOUS PRN
Status: DISCONTINUED | OUTPATIENT
Start: 2024-11-27 | End: 2024-11-27 | Stop reason: HOSPADM

## 2024-11-27 RX ORDER — DIPHENHYDRAMINE HYDROCHLORIDE 50 MG/ML
12.5 INJECTION INTRAMUSCULAR; INTRAVENOUS
Status: DISCONTINUED | OUTPATIENT
Start: 2024-11-27 | End: 2024-11-27 | Stop reason: HOSPADM

## 2024-11-27 RX ADMIN — HYDROMORPHONE HYDROCHLORIDE 1 MG: 1 INJECTION, SOLUTION INTRAMUSCULAR; INTRAVENOUS; SUBCUTANEOUS at 10:50

## 2024-11-27 RX ADMIN — PROPOFOL 150 MG: 10 INJECTION, EMULSION INTRAVENOUS at 07:38

## 2024-11-27 RX ADMIN — PROPOFOL 150 MCG/KG/MIN: 10 INJECTION, EMULSION INTRAVENOUS at 07:41

## 2024-11-27 RX ADMIN — FENTANYL CITRATE 50 MCG: 50 INJECTION, SOLUTION INTRAMUSCULAR; INTRAVENOUS at 08:17

## 2024-11-27 RX ADMIN — HYDROMORPHONE HYDROCHLORIDE 0.5 MG: 1 INJECTION, SOLUTION INTRAMUSCULAR; INTRAVENOUS; SUBCUTANEOUS at 10:25

## 2024-11-27 RX ADMIN — ROCURONIUM BROMIDE 10 MG: 10 INJECTION, SOLUTION INTRAVENOUS at 08:05

## 2024-11-27 RX ADMIN — Medication 40 MCG: at 08:22

## 2024-11-27 RX ADMIN — LEVOFLOXACIN 500 MG: 5 INJECTION, SOLUTION INTRAVENOUS at 07:31

## 2024-11-27 RX ADMIN — FENTANYL CITRATE 50 MCG: 50 INJECTION, SOLUTION INTRAMUSCULAR; INTRAVENOUS at 08:41

## 2024-11-27 RX ADMIN — MIDAZOLAM HYDROCHLORIDE 2 MG: 1 INJECTION, SOLUTION INTRAMUSCULAR; INTRAVENOUS at 07:31

## 2024-11-27 RX ADMIN — FENTANYL CITRATE 25 MCG: 50 INJECTION INTRAMUSCULAR; INTRAVENOUS at 11:21

## 2024-11-27 RX ADMIN — Medication 40 MCG: at 08:17

## 2024-11-27 RX ADMIN — FENTANYL CITRATE 50 MCG: 50 INJECTION, SOLUTION INTRAMUSCULAR; INTRAVENOUS at 07:31

## 2024-11-27 RX ADMIN — SODIUM CHLORIDE: 9 INJECTION, SOLUTION INTRAVENOUS at 07:31

## 2024-11-27 RX ADMIN — ROCURONIUM BROMIDE 50 MG: 10 INJECTION, SOLUTION INTRAVENOUS at 07:38

## 2024-11-27 RX ADMIN — Medication 40 MCG: at 09:24

## 2024-11-27 RX ADMIN — ONDANSETRON HYDROCHLORIDE 4 MG: 2 SOLUTION INTRAMUSCULAR; INTRAVENOUS at 09:34

## 2024-11-27 RX ADMIN — FENTANYL CITRATE 50 MCG: 50 INJECTION, SOLUTION INTRAMUSCULAR; INTRAVENOUS at 09:09

## 2024-11-27 RX ADMIN — ROCURONIUM BROMIDE 10 MG: 10 INJECTION, SOLUTION INTRAVENOUS at 09:08

## 2024-11-27 RX ADMIN — HYDROMORPHONE HYDROCHLORIDE 0.5 MG: 1 INJECTION, SOLUTION INTRAMUSCULAR; INTRAVENOUS; SUBCUTANEOUS at 10:38

## 2024-11-27 RX ADMIN — LIDOCAINE HYDROCHLORIDE 100 MG: 20 INJECTION, SOLUTION EPIDURAL; INFILTRATION; INTRACAUDAL; PERINEURAL at 07:38

## 2024-11-27 RX ADMIN — SUGAMMADEX 200 MG: 100 INJECTION, SOLUTION INTRAVENOUS at 09:39

## 2024-11-27 RX ADMIN — LEVOFLOXACIN 500 MG: 5 INJECTION, SOLUTION INTRAVENOUS at 07:17

## 2024-11-27 RX ADMIN — FENTANYL CITRATE 100 MCG: 50 INJECTION, SOLUTION INTRAMUSCULAR; INTRAVENOUS at 07:38

## 2024-11-27 RX ADMIN — ACETAMINOPHEN 1000 MG: 500 TABLET ORAL at 06:26

## 2024-11-27 ASSESSMENT — ENCOUNTER SYMPTOMS
GASTROINTESTINAL NEGATIVE: 1
RESPIRATORY NEGATIVE: 1

## 2024-11-27 ASSESSMENT — PAIN DESCRIPTION - LOCATION
LOCATION: BREAST

## 2024-11-27 ASSESSMENT — PAIN SCALES - GENERAL
PAINLEVEL_OUTOF10: 6
PAINLEVEL_OUTOF10: 4
PAINLEVEL_OUTOF10: 4
PAINLEVEL_OUTOF10: 7
PAINLEVEL_OUTOF10: 8
PAINLEVEL_OUTOF10: 8
PAINLEVEL_OUTOF10: 6

## 2024-11-27 ASSESSMENT — LIFESTYLE VARIABLES: SMOKING_STATUS: 1

## 2024-11-27 ASSESSMENT — PAIN DESCRIPTION - DESCRIPTORS
DESCRIPTORS: BURNING

## 2024-11-27 NOTE — H&P
HISTORY AND PHYSICAL             Date: 11/27/2024        Patient Name: Roz Amin     YOB: 1975      Age:  49 y.o.    Chief Complaint   No chief complaint on file.         History Obtained From   patient    History of Present Illness   Pt presents today for b/l mastopexy w/ poss. FNG with Darwin Brito MD     Past Medical History     Past Medical History:   Diagnosis Date    Abnormal mammogram 07/06/2022    right breast - incomplete - additional views needed    Abnormal mammogram 09/06/2023    abnormal    Abnormal mammogram of left breast 06/27/2017    Possible increasing density, left breast- add'l views completed 6/28/17 BIRADS 3 -f/u 6 mo    Abnormal ultrasound of breast 09/20/2023    bilateral us, right breast abn, rec breast bx, scheduled for 10/5/23    Anxiety     Autoimmune disorder (HCC)     Pcos    Breast cancer (HCC)     DCIS (ductal carcinoma in situ) of breast     right    Ectopic pregnancy     Fatty liver     H/O diagnostic mammography 09/20/2023    bilateral dx, right breast abn, rec breast bx, scheduled for 10/5/23    H/O diagnostic ultrasound 01/20/2023    Rt breast US birads 2/3: cluster of cysts.    H/O mammogram 07/12/2022    Right mmg birads 3 - tickeled repeat ultrasound and poss margarita in 6 months    H/O mammogram 01/04/2018; 07/10/2018    BIRADS 3/ Probably benign; BIRADS 1/Several simple cyst noted, Benign    History of mammogram 05/26/2015    Negative    History of therapeutic radiation     Hx of mammogram 09/19/2019; 12/15/20    negative; BI-RADS 1: Negative    Hyperlipidemia     Overactive bladder     Pap smear for cervical cancer screening 2/18/14; 8/12/19; 7/8/22    Negative, HPV negative; Neg/Neg HPV; neg/hpv neg     PCOS (polycystic ovarian syndrome)     Plantar fasciitis     Staph infection         Past Surgical History     Past Surgical History:   Procedure Laterality Date    ABDOMINOPLASTY  2011    BREAST BIOPSY Left 2016    nnegative pathology    BREAST BIOPSY Right  MASTOPEXY WITH POSSIBLE FREE NIPPLE GRAFT   Consultations Ordered:  None    Electronically signed by Jeanna Varela PA-C on 11/27/24 at 7:23 AM EST

## 2024-11-27 NOTE — OP NOTE
[facility] [place][note 3]          [address] [place]  [address]                            OPERATIVE REPORT      PATIENT NAME: GERA WILLOUGHBY               : 1975  MED REC NO: [id number]                       ROOM: OR  ACCOUNT NO: [id number]                       ADMIT DATE: 2024  PROVIDER: Darwin Brito MD    DATE OF SERVICE:  2024    PREOPERATIVE DIAGNOSES:       1. Status post right breast lumpectomy with radiation.     2. Carcinoma, right breast.     3. Personal history of breast cancer.     4. Breast asymmetry.    POSTOPERATIVE DIAGNOSES:       1. Status post right breast lumpectomy with radiation.     2. Carcinoma, right breast.     3. Personal history of breast cancer.     4. Breast asymmetry.    PROCEDURES PERFORMED:       1. Bilateral reduction mastopexy.     2. Creation of inferior pedicle fasciocutaneous flap, right and left breasts.     3. Right breast lumpectomy defect excision.    SURGEON:  Darwin Brito MD    ASSISTANT:  MARIAM Denton.  Due to the complexity of this procedure, surgical assistance was required.  Avery BECERRA assisted with the reduction mastopexy, creation of superomedial pedicle fasciocutaneous flaps, right and left breasts.  Creation of inferior pedicle fasciocutaneous flaps, right and left breasts.  Excision of the lumpectomy defect, irrigation, hemostasis, drain placement, complex closure of the wounds, nipple inset, and dressing placement.    ANESTHESIA:  General endotracheal.    ESTIMATED BLOOD LOSS:  25 mL.    SPECIMENS REMOVED:       1. Total excised tissue right breast 25 g.     2. Excision lumpectomy defect right breast 75 g.     3. Total excised tissue left breast 335 g.    INTRAOPERATIVE FINDINGS:  None.     COMPLICATIONS:  None.    IMPLANTS:  None.    INDICATIONS:  The patient is a 49-year-old female who underwent previous right breast lateral quadrant lumpectomy with radiation for carcinoma of the breast.   inframammary fold along the meridian.  The aperture was marked with a 38 mm Cookie cutter, incised and de-epithelialized.  The nipple was transposed this new opening in proper orientation without tension and inset with interrupted deep dermal and subcuticular 3-0 Monocryl.    Attention was then turned to the right breast.  The right circumareolar incision was made sharply.  The vertical, horizontal and inframammary incisions were made.  The superomedial pedicle was incised and de-epithelialized.  The lower pole of the breast was then incised and completely de-epithelialized inferior to the superomedial pedicle to the level of the inframammary fold.  The lumpectomy defect at the lower outer quadrant was then resected full thickness down to the pectoralis major muscle and fascia and sent as a separate specimen.  The superomedial pedicle was then dissected along its medial, lateral and inferior aspects directed down to the chest wall.  The vertical and horizontal incisions were then deepened to the chest wall fascia raising a superiorly based fasciocutaneous breast flap.  A small subglandular pocket was then created for the inferior pedicle.  Hemostasis was secured throughout.  The wound bed was then irrigated with 1 L of triple-antibiotic solution and a #19 [place] Gutierrez drain was brought out through a lateral stab incision and secured to the skin with 3-0 nylon placed within the wound bed.  The superior fasciocutaneous chest wall flap was transposed inferiorly to its new anatomic position and tailor tacked closed with 0 Prolene key stitch and surgical staples.  The 2 x 2 cm triangular skin dermal flap was created as on the left side and inset in a V-Y fashion.  The vertical and horizontal wounds were then closed with running deep dermal and subcuticular 2-0 and 3-0 PDS.  The nipple-areolar complex was then brought out 7 cm superior to the inframammary fold along the meridian.  A 38 mm aperture was designed, incised

## 2024-11-27 NOTE — ANESTHESIA PRE PROCEDURE
Department of Anesthesiology  Preprocedure Note       Name:  Roz Amin   Age:  49 y.o.  :  1975                                          MRN:  [id number]         Date:  2024      Surgeon: Surgeon(s):  Darwin Brito MD    Procedure: Procedure(s):  BILATERAL MASTOPEXY WITH POSSIBLE FREE NIPPLE GRAFT    Medications prior to admission:   Prior to Admission medications    Medication Sig Start Date End Date Taking? Authorizing Provider   dextromethorphan-guaiFENesin (MUCINEX DM)  MG per extended release tablet Take 1 tablet by mouth every 12 hours as needed for Cough or Congestion   Yes Provider, MD Castro   docusate sodium (COLACE) 100 MG capsule Take 1 capsule by mouth 2 times daily   Yes ProviderCastro MD   MOUNJARO 5 MG/0.5ML SOPN SC injection  24  Yes ProviderCastro MD   desvenlafaxine succinate (PRISTIQ) 100 MG TB24 extended release tablet Take 1 tablet by mouth 24 Yes ProviderCastro MD   NONFORMULARY Take 100 mg by mouth every evening Hemp Gummies   Yes Provider, MD Castro   MELATONIN PO Take 10 mg by mouth every evening   Yes Automatic Reconciliation, Ar       Current medications:    Current Facility-Administered Medications   Medication Dose Route Frequency Provider Last Rate Last Admin   • lidocaine PF 1 % injection 1 mL  1 mL IntraDERmal Once PRN Parker Moran, DO       • albuterol (PROVENTIL) (2.5 MG/3ML) 0.083% nebulizer solution 2.5 mg  2.5 mg Nebulization Once PRN Parker Moran, DO       • 0.9 % sodium chloride infusion   IntraVENous Continuous Parker Moran, DO       • levoFLOXacin (LEVAQUIN) 500 MG/100ML infusion 500 mg  500 mg IntraVENous Once Darwin Brito MD           Allergies:    Allergies   Allergen Reactions   • Penicillins Rash       Problem List:    Patient Active Problem List   Diagnosis Code   • PCOS (polycystic ovarian syndrome) E28.2   • Breast neoplasm, Tis (DCIS), 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

## 2024-11-27 NOTE — BRIEF OP NOTE
Brief Postoperative Note      Patient: Roz Amin  YOB: 1975  MRN: [id number]    Date of Procedure: 11/27/2024    Pre-Op Diagnosis Codes:      * Hypertrophy of breast [N62]    Post-Op Diagnosis: Post-Op Diagnosis Codes:     * Hypertrophy of breast [N62]       Procedure(s):  BILATERAL MASTOPEXY    Surgeon(s):  Darwin Brito MD    Assistant:  Surgical Assistant: Yady Bonilla  Physician Assistant: Jeanna Varela PA-C    Anesthesia: General    Estimated Blood Loss (mL): less than 100     Complications: None    Specimens:   ID Type Source Tests Collected by Time Destination   1 : excised tissue LEFT breast Tissue Breast SURGICAL PATHOLOGY Darwin Brito MD 11/27/2024 0820    2 : excised lumpectomy defect RIGHT lateral breast Tissue Breast SURGICAL PATHOLOGY Darwin Brito MD 11/27/2024 0836    3 : excised tissue RIGHT breast Tissue Breast SURGICAL PATHOLOGY Darwin Brito MD 11/27/2024 0850        Implants:  * No implants in log *      Drains:   Closed/Suction Drain Right;Lateral Breast Bulb (Active)   Site Description Clean, dry & intact 11/27/24 1138   Dressing Status Clean, dry & intact 11/27/24 1138   Drainage Appearance Serosanguinous 11/27/24 1138   Drain Status To bulb suction 11/27/24 1138   Output (ml) 10 ml 11/27/24 1138       Findings:  Infection Present At Time Of Surgery (PATOS) (choose all levels that have infection present):  No infection present  Other Findings: none    Electronically signed by Darwin Brito MD on 11/27/2024 at 2:27 PM

## 2024-11-27 NOTE — DISCHARGE INSTRUCTIONS
You may shower tomorrow (Thursday). Let soap and water gently run over the breasts. Pat dry. White tape strips and clear tape over the drain are waterproof and will stay in place until your follow up appointment in the office next week.     No heavy lifting. No strenuous activities. Sleep on your back, preferably elevated.       Along with the prescriptions we sent you, we recommend taking Tylenol every 6 hours and a stool softener daily.            DISCHARGE SUMMARY from your Nurse      PATIENT INSTRUCTIONS    After general anesthesia or intravenous sedation, for 24 hours or while taking prescription Narcotics:  Limit your activities  Do not drive and operate hazardous machinery  Do not make important personal or business decisions  Do  not drink alcoholic beverages  If you have not urinated within 8 hours after discharge, please contact your surgeon on call.    Report the following to your surgeon:  Excessive pain, swelling, redness or odor of or around the surgical area  Temperature over 100.5  Nausea and vomiting lasting longer than 4 hours or if unable to take medications  Any signs of decreased circulation or nerve impairment to extremity: change in color, persistent  numbness, tingling, coldness or increase pain  Any questions      GOOD HELP TO FIGHT AN INFECTION  Here are a few tip to help reduce the chance of getting an infection after surgery:  Wash Your Hands  Good handwashing is the most important thing you and your caregiver can do.  Wash before and after caring for any wounds.  Dry your hand with a clean towel.  Wash with soap and water for at least 20 seconds. A TIP: sing the \"Happy Birthday\" song through one time while washing to help with the timing.  Use a hand  in between washings.    Shower  When your surgeon says it is OK to take a shower, use a new bar of antibacterial soap (if that is what you use, and keep that bar of soap ONLY for your use), or antibacterial body wash.  Use a clean

## 2024-11-27 NOTE — PERIOP NOTE
Pt placed on 4L oxygen via NC for saturations in the mid to upper 80's upon arrival. Pt quickly recovered to mid to upper 90's. Dr. Chapin a bedside during this time.

## 2024-12-05 NOTE — ANESTHESIA POSTPROCEDURE EVALUATION
Department of Anesthesiology  Postprocedure Note    Patient: Roz Amin  MRN: [id number]  YOB: 1975  Date of evaluation: 12/5/2024    Procedure Summary       Date: 11/27/24 Room / Location: [facility] MAIN OR  / [facility] MAIN OR    Anesthesia Start: 0730 Anesthesia Stop: 1003    Procedure: BILATERAL MASTOPEXY (Bilateral: Breast) Diagnosis:       Hypertrophy of breast      (Hypertrophy of breast [N62])    Surgeons: Darwin Brito MD Responsible Provider: Jarret Chapin MD    Anesthesia Type: TIVA, general ASA Status: 2            Anesthesia Type: No value filed.    Cindi Phase I: Cindi Score: 8    Cindi Phase II: Cindi Score: 10    Anesthesia Post Evaluation    No notable events documented.

## 2025-09-06 ENCOUNTER — PATIENT MESSAGE (OUTPATIENT)
Age: 50
End: 2025-09-06

## 2025-09-06 DIAGNOSIS — D05.11 BREAST NEOPLASM, TIS (DCIS), RIGHT: Primary | ICD-10-CM

## (undated) DEVICE — BLUNTFILL WITH FILTER: Brand: MONOJECT

## (undated) DEVICE — SOLUTION SCRB 2OZ 10% POVIDONE IOD ANTIMIC BTL

## (undated) DEVICE — SPONGE LAP W18XL18IN WHT COT 4 PLY FLD STRUNG RADPQ DISP ST 2 PER PACK

## (undated) DEVICE — SOLUTION IRRIG 1000ML STRL H2O USP PLAS POUR BTL

## (undated) DEVICE — SPONGE GZ W4XL4IN COT 12 PLY TYP VII WVN C FLD DSGN STERILE

## (undated) DEVICE — DRAIN SURG 19FR L025IN DIA63MM SIL CHN RND FULL FLUT CLS

## (undated) DEVICE — GLOVE ORANGE PI 7 1/2   MSG9075

## (undated) DEVICE — SYRINGE FLSH IV STD 10 CC W/O CANN PREFILLED NACL POSIFLUSH 306546

## (undated) DEVICE — STRIP,CLOSURE,WOUND,MEDI-STRIP,1/2X4: Brand: MEDLINE

## (undated) DEVICE — HYPODERMIC SAFETY NEEDLE: Brand: MONOJECT

## (undated) DEVICE — PLASMABLADE PS210-030S 3.0S LOCK: Brand: PLASMABLADE™

## (undated) DEVICE — GLOVE SURG SZ 65 L12IN FNGR THK126MIL CRM LTX FREE

## (undated) DEVICE — SUTURE MONOCRYL 2/0 27IN UNDYED SH V20

## (undated) DEVICE — 3M™ TEGADERM™ TRANSPARENT FILM DRESSING FRAME STYLE, 1626W, 4 IN X 4-3/4 IN (10 CM X 12 CM), 50/CT 4CT/CASE: Brand: 3M™ TEGADERM™

## (undated) DEVICE — SOLUTION IRRIG 500ML 0.9% SOD CHLO USP POUR PLAS BTL

## (undated) DEVICE — POST-SURG COMPRESSION BRA,XL: Brand: MEDLINE

## (undated) DEVICE — SUTURE PDS + SZ 2 0 L27IN ABSRB VLT L26MM SH 1 2 CIR PDP317H

## (undated) DEVICE — GLOVE SURG SZ 65 THK91MIL LTX FREE SYN POLYISOPRENE

## (undated) DEVICE — SOLUTION IV 1000ML LAC RINGERS PH 6.5 INJ USP VIAFLX PLAS

## (undated) DEVICE — DRAPE FLD WRM W44XL66IN C6L FOR INTRATEMP SYS THERMABASIN

## (undated) DEVICE — STAPLER SKIN H3.9MM WIRE DIA0.58MM CRWN 6.9MM 35 STPL ROT

## (undated) DEVICE — SKIN PREP TRAY 4 COMPARTM TRAY: Brand: MEDLINE INDUSTRIES, INC.

## (undated) DEVICE — PENCIL ES CRD L10FT HND SWCHING ROCK SWCH W/ EDGE COAT BLDE

## (undated) DEVICE — CHEST PACK-SFMCASU: Brand: MEDLINE INDUSTRIES, INC.

## (undated) DEVICE — SUTURE ETHILON SZ 3-0 L18IN NONABSORBABLE BLK PS-2 L19MM 3/8 1669H

## (undated) DEVICE — SOLUTION IRRIG 1000ML 0.9% SOD CHL USP POUR PLAS BTL

## (undated) DEVICE — RESERVOIR,SUCTION,100CC,SILICONE: Brand: MEDLINE

## (undated) DEVICE — SUTURE MONOCRYL SZ 2-0 L27IN ABSRB UD SH L26MM TAPERPOINT NDL Y417H

## (undated) DEVICE — COVER US PRB W15XL120CM W/ GEL RUBBERBAND TAPE STRP FLD GEN

## (undated) DEVICE — DRESSING FOAM DISK DIA1IN H 7MM HYDRPHLC CHG IMPREG IN SL

## (undated) DEVICE — TUBING SUCT L9FT FOR AUTOFUSE INFLTR SYS

## (undated) DEVICE — BLANKET WRM W35.4XL86.6IN FULL UNDERBODY + FORC AIR

## (undated) DEVICE — SUTURE ABSORBABLE MONOFILAMENT 3-0 SH 27 IN VIO PDS + PDP316H

## (undated) DEVICE — SUTURE MONOCRYL SZ 3-0 L27IN ABSRB UD L19MM PS-2 3/8 CIR PRIM Y427H

## (undated) DEVICE — SUTURE PERMA-HAND SZ 2-0 L30IN NONABSORBABLE BLK L26MM SH K833H

## (undated) DEVICE — CANISTER, RIGID, 3000CC: Brand: MEDLINE INDUSTRIES, INC.

## (undated) DEVICE — ELECTRODE PT RET AD L9FT HI MOIST COND ADH HYDRGEL CORDED

## (undated) DEVICE — PLASTICS -SFMC: Brand: MEDLINE INDUSTRIES, INC.

## (undated) DEVICE — UNIVERSAL DRAPE: Brand: MEDLINE INDUSTRIES, INC.

## (undated) DEVICE — SUTURE PROL SZ 0 L30IN NONABSORBABLE BLU L36MM CT-1 1/2 CIR 8424H

## (undated) DEVICE — SUTURE VICRYL SZ 0 L27IN ABSRB UD SH L26MM 1/2 CIR TAPERPOINT J418H